# Patient Record
Sex: MALE | Race: BLACK OR AFRICAN AMERICAN | NOT HISPANIC OR LATINO | URBAN - METROPOLITAN AREA
[De-identification: names, ages, dates, MRNs, and addresses within clinical notes are randomized per-mention and may not be internally consistent; named-entity substitution may affect disease eponyms.]

---

## 2019-12-11 ENCOUNTER — OFFICE VISIT (OUTPATIENT)
Dept: URGENT CARE | Facility: CLINIC | Age: 49
End: 2019-12-11
Payer: COMMERCIAL

## 2019-12-11 ENCOUNTER — HOSPITAL ENCOUNTER (INPATIENT)
Facility: OTHER | Age: 49
LOS: 3 days | Discharge: HOME | DRG: 291 | End: 2019-12-14
Attending: EMERGENCY MEDICINE | Admitting: HOSPITALIST
Payer: COMMERCIAL

## 2019-12-11 VITALS
HEIGHT: 68 IN | HEART RATE: 167 BPM | TEMPERATURE: 99 F | DIASTOLIC BLOOD PRESSURE: 106 MMHG | OXYGEN SATURATION: 100 % | BODY MASS INDEX: 19.7 KG/M2 | WEIGHT: 130 LBS | SYSTOLIC BLOOD PRESSURE: 149 MMHG | RESPIRATION RATE: 20 BRPM

## 2019-12-11 DIAGNOSIS — I48.91 ATRIAL FIBRILLATION WITH RVR: Primary | ICD-10-CM

## 2019-12-11 DIAGNOSIS — J18.9 PNEUMONIA OF BOTH LUNGS DUE TO INFECTIOUS ORGANISM, UNSPECIFIED PART OF LUNG: ICD-10-CM

## 2019-12-11 DIAGNOSIS — R05.9 COUGH: ICD-10-CM

## 2019-12-11 DIAGNOSIS — I48.91 ATRIAL FIBRILLATION WITH RAPID VENTRICULAR RESPONSE: ICD-10-CM

## 2019-12-11 DIAGNOSIS — I48.91 ATRIAL FIBRILLATION WITH RVR: ICD-10-CM

## 2019-12-11 DIAGNOSIS — Z76.89 ENCOUNTER TO ESTABLISH CARE: ICD-10-CM

## 2019-12-11 DIAGNOSIS — I50.21 ACUTE SYSTOLIC CONGESTIVE HEART FAILURE: Primary | ICD-10-CM

## 2019-12-11 PROBLEM — R79.89 ELEVATED TROPONIN: Status: ACTIVE | Noted: 2019-12-11

## 2019-12-11 PROBLEM — I10 ESSENTIAL HYPERTENSION: Status: ACTIVE | Noted: 2019-12-11

## 2019-12-11 PROBLEM — R79.89 ELEVATED BRAIN NATRIURETIC PEPTIDE (BNP) LEVEL: Status: ACTIVE | Noted: 2019-12-11

## 2019-12-11 PROBLEM — I95.9 HYPOTENSION: Status: ACTIVE | Noted: 2019-12-11

## 2019-12-11 LAB
ALBUMIN SERPL BCP-MCNC: 3.5 G/DL (ref 3.5–5.2)
ALP SERPL-CCNC: 84 U/L (ref 55–135)
ALT SERPL W/O P-5'-P-CCNC: 44 U/L (ref 10–44)
AMPHET+METHAMPHET UR QL: NEGATIVE
ANION GAP SERPL CALC-SCNC: 10 MMOL/L (ref 8–16)
AST SERPL-CCNC: 35 U/L (ref 10–40)
BARBITURATES UR QL SCN>200 NG/ML: NEGATIVE
BASOPHILS # BLD AUTO: 0.05 K/UL (ref 0–0.2)
BASOPHILS NFR BLD: 0.4 % (ref 0–1.9)
BENZODIAZ UR QL SCN>200 NG/ML: NEGATIVE
BILIRUB SERPL-MCNC: 0.9 MG/DL (ref 0.1–1)
BILIRUB UR QL STRIP: NEGATIVE
BNP SERPL-MCNC: 875 PG/ML (ref 0–99)
BUN SERPL-MCNC: 14 MG/DL (ref 6–20)
BZE UR QL SCN: NEGATIVE
CALCIUM SERPL-MCNC: 9.4 MG/DL (ref 8.7–10.5)
CANNABINOIDS UR QL SCN: NEGATIVE
CHLORIDE SERPL-SCNC: 106 MMOL/L (ref 95–110)
CLARITY UR: CLEAR
CO2 SERPL-SCNC: 25 MMOL/L (ref 23–29)
COLOR UR: YELLOW
CREAT SERPL-MCNC: 1.3 MG/DL (ref 0.5–1.4)
CREAT UR-MCNC: 5.4 MG/DL (ref 23–375)
CTP QC/QA: YES
D DIMER PPP IA.FEU-MCNC: 1.08 MG/L FEU
DIFFERENTIAL METHOD: ABNORMAL
EOSINOPHIL # BLD AUTO: 0 K/UL (ref 0–0.5)
EOSINOPHIL NFR BLD: 0.2 % (ref 0–8)
ERYTHROCYTE [DISTWIDTH] IN BLOOD BY AUTOMATED COUNT: 15.8 % (ref 11.5–14.5)
EST. GFR  (AFRICAN AMERICAN): >60 ML/MIN/1.73 M^2
EST. GFR  (NON AFRICAN AMERICAN): >60 ML/MIN/1.73 M^2
ETHANOL UR-MCNC: <10 MG/DL
FLUAV AG NPH QL: NEGATIVE
FLUBV AG NPH QL: NEGATIVE
GLUCOSE SERPL-MCNC: 112 MG/DL (ref 70–110)
GLUCOSE UR QL STRIP: NEGATIVE
HCT VFR BLD AUTO: 49.1 % (ref 40–54)
HGB BLD-MCNC: 15.2 G/DL (ref 14–18)
HGB UR QL STRIP: NEGATIVE
IMM GRANULOCYTES # BLD AUTO: 0.06 K/UL (ref 0–0.04)
IMM GRANULOCYTES NFR BLD AUTO: 0.4 % (ref 0–0.5)
INR PPP: 1 (ref 0.8–1.2)
KETONES UR QL STRIP: NEGATIVE
LACTATE SERPL-SCNC: 1.5 MMOL/L (ref 0.5–2.2)
LEUKOCYTE ESTERASE UR QL STRIP: NEGATIVE
LYMPHOCYTES # BLD AUTO: 1.1 K/UL (ref 1–4.8)
LYMPHOCYTES NFR BLD: 8.2 % (ref 18–48)
MAGNESIUM SERPL-MCNC: 1.8 MG/DL (ref 1.6–2.6)
MCH RBC QN AUTO: 27.5 PG (ref 27–31)
MCHC RBC AUTO-ENTMCNC: 31 G/DL (ref 32–36)
MCV RBC AUTO: 89 FL (ref 82–98)
METHADONE UR QL SCN>300 NG/ML: NEGATIVE
MONOCYTES # BLD AUTO: 0.7 K/UL (ref 0.3–1)
MONOCYTES NFR BLD: 4.9 % (ref 4–15)
NEUTROPHILS # BLD AUTO: 11.6 K/UL (ref 1.8–7.7)
NEUTROPHILS NFR BLD: 85.9 % (ref 38–73)
NITRITE UR QL STRIP: NEGATIVE
NRBC BLD-RTO: 0 /100 WBC
OPIATES UR QL SCN: NEGATIVE
PCP UR QL SCN>25 NG/ML: NEGATIVE
PH UR STRIP: 7 [PH] (ref 5–8)
PLATELET # BLD AUTO: 230 K/UL (ref 150–350)
PMV BLD AUTO: 12.7 FL (ref 9.2–12.9)
POTASSIUM SERPL-SCNC: 4 MMOL/L (ref 3.5–5.1)
PROCALCITONIN SERPL IA-MCNC: 0.19 NG/ML
PROT SERPL-MCNC: 7.2 G/DL (ref 6–8.4)
PROT UR QL STRIP: NEGATIVE
PROTHROMBIN TIME: 11.4 SEC (ref 9–12.5)
RBC # BLD AUTO: 5.52 M/UL (ref 4.6–6.2)
SODIUM SERPL-SCNC: 141 MMOL/L (ref 136–145)
SP GR UR STRIP: 1.01 (ref 1–1.03)
TOXICOLOGY INFORMATION: ABNORMAL
TROPONIN I SERPL DL<=0.01 NG/ML-MCNC: 0.04 NG/ML (ref 0–0.03)
TROPONIN I SERPL DL<=0.01 NG/ML-MCNC: 0.04 NG/ML (ref 0–0.03)
TROPONIN I SERPL DL<=0.01 NG/ML-MCNC: 0.05 NG/ML (ref 0–0.03)
TROPONIN I SERPL DL<=0.01 NG/ML-MCNC: 0.05 NG/ML (ref 0–0.03)
TROPONIN I SERPL DL<=0.01 NG/ML-MCNC: 0.06 NG/ML (ref 0–0.03)
TSH SERPL DL<=0.005 MIU/L-ACNC: 1.72 UIU/ML (ref 0.4–4)
URN SPEC COLLECT METH UR: NORMAL
UROBILINOGEN UR STRIP-ACNC: NEGATIVE EU/DL
WBC # BLD AUTO: 13.53 K/UL (ref 3.9–12.7)

## 2019-12-11 PROCEDURE — 83735 ASSAY OF MAGNESIUM: CPT

## 2019-12-11 PROCEDURE — 96365 THER/PROPH/DIAG IV INF INIT: CPT

## 2019-12-11 PROCEDURE — 99291 CRITICAL CARE FIRST HOUR: CPT | Mod: 25

## 2019-12-11 PROCEDURE — 93010 ELECTROCARDIOGRAM REPORT: CPT | Mod: ,,, | Performed by: INTERNAL MEDICINE

## 2019-12-11 PROCEDURE — 99204 PR OFFICE/OUTPT VISIT, NEW, LEVL IV, 45-59 MIN: ICD-10-PCS | Mod: S$GLB,,, | Performed by: PHYSICIAN ASSISTANT

## 2019-12-11 PROCEDURE — 99223 1ST HOSP IP/OBS HIGH 75: CPT | Mod: ,,, | Performed by: HOSPITALIST

## 2019-12-11 PROCEDURE — 83605 ASSAY OF LACTIC ACID: CPT

## 2019-12-11 PROCEDURE — 25000003 PHARM REV CODE 250: Performed by: EMERGENCY MEDICINE

## 2019-12-11 PROCEDURE — 85610 PROTHROMBIN TIME: CPT

## 2019-12-11 PROCEDURE — 63600175 PHARM REV CODE 636 W HCPCS: Performed by: HOSPITALIST

## 2019-12-11 PROCEDURE — 93005 ELECTROCARDIOGRAM TRACING: CPT

## 2019-12-11 PROCEDURE — 81003 URINALYSIS AUTO W/O SCOPE: CPT

## 2019-12-11 PROCEDURE — 96367 TX/PROPH/DG ADDL SEQ IV INF: CPT

## 2019-12-11 PROCEDURE — 85379 FIBRIN DEGRADATION QUANT: CPT

## 2019-12-11 PROCEDURE — 25000003 PHARM REV CODE 250: Performed by: INTERNAL MEDICINE

## 2019-12-11 PROCEDURE — 63600175 PHARM REV CODE 636 W HCPCS: Performed by: EMERGENCY MEDICINE

## 2019-12-11 PROCEDURE — 87804 POCT INFLUENZA A/B: ICD-10-PCS | Mod: QW,S$GLB,, | Performed by: PHYSICIAN ASSISTANT

## 2019-12-11 PROCEDURE — 84145 PROCALCITONIN (PCT): CPT

## 2019-12-11 PROCEDURE — 99223 PR INITIAL HOSPITAL CARE,LEVL III: ICD-10-PCS | Mod: ,,, | Performed by: HOSPITALIST

## 2019-12-11 PROCEDURE — 93005 EKG 12-LEAD: ICD-10-PCS | Mod: S$GLB,,, | Performed by: PHYSICIAN ASSISTANT

## 2019-12-11 PROCEDURE — 20000000 HC ICU ROOM

## 2019-12-11 PROCEDURE — 83880 ASSAY OF NATRIURETIC PEPTIDE: CPT

## 2019-12-11 PROCEDURE — 80053 COMPREHEN METABOLIC PANEL: CPT

## 2019-12-11 PROCEDURE — 87040 BLOOD CULTURE FOR BACTERIA: CPT

## 2019-12-11 PROCEDURE — 99204 OFFICE O/P NEW MOD 45 MIN: CPT | Mod: S$GLB,,, | Performed by: PHYSICIAN ASSISTANT

## 2019-12-11 PROCEDURE — 80307 DRUG TEST PRSMV CHEM ANLYZR: CPT

## 2019-12-11 PROCEDURE — 93010 EKG 12-LEAD: ICD-10-PCS | Mod: ,,, | Performed by: INTERNAL MEDICINE

## 2019-12-11 PROCEDURE — 84484 ASSAY OF TROPONIN QUANT: CPT

## 2019-12-11 PROCEDURE — 85025 COMPLETE CBC W/AUTO DIFF WBC: CPT

## 2019-12-11 PROCEDURE — 96361 HYDRATE IV INFUSION ADD-ON: CPT

## 2019-12-11 PROCEDURE — 93010 ELECTROCARDIOGRAM REPORT: CPT | Mod: S$GLB,,, | Performed by: INTERNAL MEDICINE

## 2019-12-11 PROCEDURE — 87804 INFLUENZA ASSAY W/OPTIC: CPT | Mod: QW,S$GLB,, | Performed by: PHYSICIAN ASSISTANT

## 2019-12-11 PROCEDURE — 84484 ASSAY OF TROPONIN QUANT: CPT | Mod: 91

## 2019-12-11 PROCEDURE — 93005 ELECTROCARDIOGRAM TRACING: CPT | Mod: S$GLB,,, | Performed by: PHYSICIAN ASSISTANT

## 2019-12-11 PROCEDURE — 96375 TX/PRO/DX INJ NEW DRUG ADDON: CPT

## 2019-12-11 PROCEDURE — 93010 EKG 12-LEAD: ICD-10-PCS | Mod: S$GLB,,, | Performed by: INTERNAL MEDICINE

## 2019-12-11 PROCEDURE — 25000003 PHARM REV CODE 250: Performed by: HOSPITALIST

## 2019-12-11 PROCEDURE — 84443 ASSAY THYROID STIM HORMONE: CPT

## 2019-12-11 PROCEDURE — 94761 N-INVAS EAR/PLS OXIMETRY MLT: CPT

## 2019-12-11 PROCEDURE — 63600175 PHARM REV CODE 636 W HCPCS: Performed by: INTERNAL MEDICINE

## 2019-12-11 PROCEDURE — 36415 COLL VENOUS BLD VENIPUNCTURE: CPT

## 2019-12-11 RX ORDER — SODIUM CHLORIDE 0.9 % (FLUSH) 0.9 %
10 SYRINGE (ML) INJECTION
Status: DISCONTINUED | OUTPATIENT
Start: 2019-12-11 | End: 2019-12-14 | Stop reason: HOSPADM

## 2019-12-11 RX ORDER — FUROSEMIDE 10 MG/ML
40 INJECTION INTRAMUSCULAR; INTRAVENOUS 2 TIMES DAILY
Status: DISCONTINUED | OUTPATIENT
Start: 2019-12-11 | End: 2019-12-11

## 2019-12-11 RX ORDER — POTASSIUM CHLORIDE 20 MEQ/1
20 TABLET, EXTENDED RELEASE ORAL 2 TIMES DAILY
Status: COMPLETED | OUTPATIENT
Start: 2019-12-11 | End: 2019-12-12

## 2019-12-11 RX ORDER — ONDANSETRON 8 MG/1
8 TABLET, ORALLY DISINTEGRATING ORAL EVERY 8 HOURS PRN
Status: DISCONTINUED | OUTPATIENT
Start: 2019-12-11 | End: 2019-12-14 | Stop reason: HOSPADM

## 2019-12-11 RX ORDER — ENOXAPARIN SODIUM 100 MG/ML
40 INJECTION SUBCUTANEOUS EVERY 24 HOURS
Status: DISCONTINUED | OUTPATIENT
Start: 2019-12-11 | End: 2019-12-11

## 2019-12-11 RX ORDER — METOPROLOL TARTRATE 25 MG/1
25 TABLET, FILM COATED ORAL 2 TIMES DAILY
Status: DISCONTINUED | OUTPATIENT
Start: 2019-12-12 | End: 2019-12-12

## 2019-12-11 RX ORDER — DILTIAZEM HCL 1 MG/ML
5 INJECTION, SOLUTION INTRAVENOUS CONTINUOUS
Status: DISCONTINUED | OUTPATIENT
Start: 2019-12-11 | End: 2019-12-12

## 2019-12-11 RX ORDER — ACETAMINOPHEN 325 MG/1
650 TABLET ORAL EVERY 4 HOURS PRN
Status: DISCONTINUED | OUTPATIENT
Start: 2019-12-11 | End: 2019-12-11

## 2019-12-11 RX ORDER — FUROSEMIDE 10 MG/ML
40 INJECTION INTRAMUSCULAR; INTRAVENOUS ONCE
Status: COMPLETED | OUTPATIENT
Start: 2019-12-11 | End: 2019-12-11

## 2019-12-11 RX ORDER — GUAIFENESIN 600 MG/1
600 TABLET, EXTENDED RELEASE ORAL 2 TIMES DAILY
Status: DISCONTINUED | OUTPATIENT
Start: 2019-12-11 | End: 2019-12-14 | Stop reason: HOSPADM

## 2019-12-11 RX ORDER — METOPROLOL TARTRATE 1 MG/ML
5 INJECTION, SOLUTION INTRAVENOUS
Status: COMPLETED | OUTPATIENT
Start: 2019-12-11 | End: 2019-12-11

## 2019-12-11 RX ORDER — ENOXAPARIN SODIUM 100 MG/ML
1 INJECTION SUBCUTANEOUS EVERY 12 HOURS
Status: DISCONTINUED | OUTPATIENT
Start: 2019-12-11 | End: 2019-12-12

## 2019-12-11 RX ORDER — SODIUM CHLORIDE 0.9 % (FLUSH) 0.9 %
10 SYRINGE (ML) INJECTION
Status: DISCONTINUED | OUTPATIENT
Start: 2019-12-11 | End: 2019-12-11

## 2019-12-11 RX ORDER — LEVOFLOXACIN 5 MG/ML
750 INJECTION, SOLUTION INTRAVENOUS
Status: DISCONTINUED | OUTPATIENT
Start: 2019-12-12 | End: 2019-12-13

## 2019-12-11 RX ORDER — BENZONATATE 100 MG/1
100 CAPSULE ORAL 3 TIMES DAILY PRN
Status: DISCONTINUED | OUTPATIENT
Start: 2019-12-11 | End: 2019-12-14 | Stop reason: HOSPADM

## 2019-12-11 RX ORDER — DILTIAZEM HYDROCHLORIDE 5 MG/ML
10 INJECTION INTRAVENOUS
Status: COMPLETED | OUTPATIENT
Start: 2019-12-11 | End: 2019-12-11

## 2019-12-11 RX ORDER — ACETAMINOPHEN 325 MG/1
650 TABLET ORAL EVERY 8 HOURS PRN
Status: DISCONTINUED | OUTPATIENT
Start: 2019-12-11 | End: 2019-12-14 | Stop reason: HOSPADM

## 2019-12-11 RX ADMIN — DILTIAZEM HYDROCHLORIDE 5 MG/HR: 5 INJECTION INTRAVENOUS at 03:12

## 2019-12-11 RX ADMIN — ENOXAPARIN SODIUM 70 MG: 100 INJECTION SUBCUTANEOUS at 07:12

## 2019-12-11 RX ADMIN — POTASSIUM CHLORIDE 20 MEQ: 1500 TABLET, EXTENDED RELEASE ORAL at 07:12

## 2019-12-11 RX ADMIN — SODIUM CHLORIDE 500 ML: 0.9 INJECTION, SOLUTION INTRAVENOUS at 01:12

## 2019-12-11 RX ADMIN — AZITHROMYCIN MONOHYDRATE 500 MG: 500 INJECTION, POWDER, LYOPHILIZED, FOR SOLUTION INTRAVENOUS at 12:12

## 2019-12-11 RX ADMIN — FUROSEMIDE 40 MG: 10 INJECTION, SOLUTION INTRAMUSCULAR; INTRAVENOUS at 07:12

## 2019-12-11 RX ADMIN — METOPROLOL TARTRATE 5 MG: 1 INJECTION, SOLUTION INTRAVENOUS at 10:12

## 2019-12-11 RX ADMIN — GUAIFENESIN 600 MG: 600 TABLET, EXTENDED RELEASE ORAL at 08:12

## 2019-12-11 RX ADMIN — Medication 500 ML: at 10:12

## 2019-12-11 RX ADMIN — CEFTRIAXONE 1 G: 1 INJECTION, SOLUTION INTRAVENOUS at 11:12

## 2019-12-11 RX ADMIN — DILTIAZEM HYDROCHLORIDE 10 MG: 5 INJECTION INTRAVENOUS at 12:12

## 2019-12-11 NOTE — PATIENT INSTRUCTIONS
- Based on your exam today I fell you need further evaluation immediately.  You should go to the ER of your choice for further evaluation and treatment.

## 2019-12-11 NOTE — ASSESSMENT & PLAN NOTE
-Treatment as above.  -Likely secondary to demand ischemia from afib/rvr  -No chest pain and no obvious ischemic changes on EKG at this time.

## 2019-12-11 NOTE — ED NOTES
Pt  to er with c/o cough and cold the patient was sent over from urgent care because he has afib with rvr. Pt denies chest pain , sob or palpitation . Pt aaox3 skin warm and dry heart rate irregular rate tachy. Lungs clear air movement good ./ abdomin soft  Non-tender with normal active bowels . No peripheral edema noted.

## 2019-12-11 NOTE — ED NOTES
Pt AAOx4, resp pattern even and non labored. Per Marker MD, notify eco team to perform eco at bedside. Per Marker MD, start diltiazem before pt goes to ICU. Pt  SBP 96/77, diltiazem drip held. Report to be called to ICU.

## 2019-12-11 NOTE — ASSESSMENT & PLAN NOTE
-Mr. Thomas is admitted to inpatient status in our ICU  -No prior cardiac history noted.  TSH is normal.  Troponin minimally elevated.  No chest pain  -Suspect secondary to coughing and respiratory illness  -Did not respond to well to lopressor in ER.  HR transiently improved after receiving diltiazem 10 but his blood pressure did drop  -Will order echo and trend troponins.  -Will attempt cardizem drip if BP will allow  -[id number] score of 1 for hypertension.  Echo to eval for CHF is pending.  Hopefully will not need anticoagulation.  -Will consult cardiology for evaluation.

## 2019-12-11 NOTE — ASSESSMENT & PLAN NOTE
-Suspect due to lopressor and diltiazem in ER and not sepsis, but cannot be certain so will monitor in ICU.  -Check cortisol in AM  -Trend troponins  -Check echo  -Avoiding fluids at this moment due to elevated BNP.

## 2019-12-11 NOTE — ASSESSMENT & PLAN NOTE
-Noted to be 875 on admit  -Likely due to transient diminished cardiac output from afib/rvr  -Checking echo  -Trend troponins  -Strict ins/outs and daily weights.

## 2019-12-11 NOTE — ED NOTES
Rounding on the patient has been done. he has been updated on the plan of care and his current status. Pain was assessed and is currently a 0/10. Comfort positioning and restroom needs were addressed. Necessary items were placed with in his reach and he was advised when a reassessment would take place. The call bell remains at the bedside for any additional patient needs. The patient is resting comfortably on the stretcher, respirations are even and unlabored, skin warm and dry. Will continue to monitor. Heart rate 112 to 140 on monitor . md informed

## 2019-12-11 NOTE — ED NOTES
Pt scheduled to leave for ECO, but pt has an unstable BP. Marker, MD notified. Waiting for further instructions from MD.

## 2019-12-11 NOTE — ASSESSMENT & PLAN NOTE
-Diagnosed several years ago, but has not been on treatment for at least three years.  -EKG shows likely LVH  -Hypertensive on admit but became hypotensive after receiving lopressor and diltiazem  -Monitor closely in ICU.

## 2019-12-11 NOTE — HPI
"Mr. Thomas is a 49 year old man with history of hypertension and medication non-compliance who came in for evaluation of racing heart and codl symptoms for three days.  He states he has had a dry cough for the last three days and took cough syrup with minimal relief.  He denies fever, chills, headache, myalgias, body aches, chest pain and fever.  His mother compliments the history and ntoes that last night he seemed to be coughing more and was working a bit harder to breath.  They went to urgent care this morning and he was noted to have a pulse of 167.  EKG was obtained which showed atrial fibrillation with rvr and he was referred to our ER.  He notes that he could "adams feel" that his heart rate was going fast.  His mother interjects that he was very weak and light headed this morning.  In the ER he was initially hypertensive and found to have afib with rvr.  He was given lopressor with minimal results and then given diltiazem and his hr quickly improved to the 90s.  Subsequently his blood pressure dropped into the upper 80s, but he continued without symptoms of light headedness or shortness of breath even when raising himself up for exam.  He is admitted to the ICU with low suspicious of sepsis for close monitoring, cardizem drip and IV antibiotics for pneumonia.  "

## 2019-12-11 NOTE — ED NOTES
Bed: 02  Expected date:   Expected time:   Means of arrival:   Comments:  AFIB with RVR from urgent care

## 2019-12-11 NOTE — H&P
"[facility] Hospital Medicine  History & Physical    Patient Name: Ilya Thomas  MRN: [id number]  Admission Date: 12/11/2019  Attending Physician: Nicholas Vargas MD  Primary Care Provider: Primary Doctor No         Patient information was obtained from patient, relative(s) and ER records.     Subjective:     Principal Problem:Atrial fibrillation with RVR    Chief Complaint:   Chief Complaint   Patient presents with    Atrial Fibrillation     sent to ED from , AFIB w/ RVR. Pt c/o cough and SOB        HPI: Mr. Thomas is a 49 year old man with history of hypertension and medication non-compliance who came in for evaluation of racing heart and codl symptoms for three days.  He states he has had a dry cough for the last three days and took cough syrup with minimal relief.  He denies fever, chills, headache, myalgias, body aches, chest pain and fever.  His mother compliments the history and ntoes that last night he seemed to be coughing more and was working a bit harder to breath.  They went to urgent care this morning and he was noted to have a pulse of 167.  EKG was obtained which showed atrial fibrillation with rvr and he was referred to our ER.  He notes that he could "adams feel" that his heart rate was going fast.  His mother interjects that he was very weak and light headed this morning.  In the ER he was initially hypertensive and found to have afib with rvr.  He was given lopressor with minimal results and then given diltiazem and his hr quickly improved to the 90s.  Subsequently his blood pressure dropped into the upper 80s, but he continued without symptoms of light headedness or shortness of breath even when raising himself up for exam.  He is admitted to the ICU with low suspicious of sepsis for close monitoring, cardizem drip and IV antibiotics for pneumonia.    Past Medical History:   Diagnosis Date    Hypertension        Past Surgical History:   Procedure Laterality Date    " HERNIA REPAIR         Review of patient's allergies indicates:  No Known Allergies    No current facility-administered medications on file prior to encounter.      No current outpatient medications on file prior to encounter.     Family History     Problem Relation (Age of Onset)    No Known Problems Mother        Tobacco Use    Smoking status: Never Smoker   Substance and Sexual Activity    Alcohol use: Never     Frequency: Never    Drug use: Never    Sexual activity: Not Currently     Review of Systems   Constitutional: Negative for activity change, appetite change and fever.   HENT: Negative for congestion and dental problem.    Eyes: Negative for discharge and itching.   Respiratory: Positive for cough. Negative for apnea, chest tightness and shortness of breath.    Cardiovascular: Negative for chest pain and leg swelling.   Gastrointestinal: Negative for abdominal distention and abdominal pain.   Endocrine: Negative for cold intolerance and heat intolerance.   Genitourinary: Negative for difficulty urinating and dysuria.   Musculoskeletal: Negative for arthralgias and back pain.   Allergic/Immunologic: Negative for environmental allergies and food allergies.   Neurological: Positive for weakness and light-headedness. Negative for dizziness and facial asymmetry.   Hematological: Negative for adenopathy. Does not bruise/bleed easily.   Psychiatric/Behavioral: Negative for agitation and behavioral problems.     Objective:     Vital Signs (Most Recent):  Temp: 99.3 °F (37.4 °C) (12/11/19 1515)  Pulse: (!) 132 (12/11/19 1630)  Resp: (!) 25 (12/11/19 1630)  BP: (!) 121/104 (12/11/19 1630)  SpO2: 97 % (12/11/19 1630) Vital Signs (24h Range):  Temp:  [98.2 °F (36.8 °C)-99.3 °F (37.4 °C)] 99.3 °F (37.4 °C)  Pulse:  [] 132  Resp:  [16-32] 25  SpO2:  [83 %-100 %] 97 %  BP: ()/() 121/104     Weight: 65.5 kg (144 lb 6.4 oz)  Body mass index is 21.96 kg/m².    Physical Exam   Constitutional: He is  oriented to person, place, and time. He appears well-developed.   Thin man with a mildly toxic appearance   HENT:   Head: Normocephalic and atraumatic.   Eyes: Pupils are equal, round, and reactive to light. EOM are normal.   Neck: Normal range of motion. Neck supple.   Cardiovascular:   Irregularly irregular, tachycardic, no murmurs   Pulmonary/Chest: Effort normal and breath sounds normal. No respiratory distress.   Abdominal: Soft. Bowel sounds are normal. He exhibits no distension. There is no tenderness.   Musculoskeletal: Normal range of motion. He exhibits no edema.   Neurological: He is oriented to person, place, and time. No cranial nerve deficit. Coordination normal.   Skin: Skin is warm and dry.   Psychiatric: He has a normal mood and affect. His behavior is normal.   Vitals reviewed.        CRANIAL NERVES     CN III, IV, VI   Pupils are equal, round, and reactive to light.  Extraocular motions are normal.        Significant Labs: All pertinent labs within the past 24 hours have been reviewed.    Significant Imaging: I have reviewed and interpreted all pertinent imaging results/findings within the past 24 hours.    Assessment/Plan:     * Atrial fibrillation with RVR  -Mr. Thomas is admitted to inpatient status in our ICU  -No prior cardiac history noted.  TSH is normal.  Troponin minimally elevated.  No chest pain  -Suspect secondary to coughing and respiratory illness  -Did not respond to well to lopressor in ER.  HR transiently improved after receiving diltiazem 10 but his blood pressure did drop  -Will order echo and trend troponins.  -Will attempt cardizem drip if BP will allow  -[id number] score of 1 for hypertension.  Echo to eval for CHF is pending.  Hopefully will not need anticoagulation.  -Will consult cardiology for evaluation.      Community acquired pneumonia of right lung  -On admit he was euthermic with normal lactic acid but leukocytosis and evidence of bilateral pneumonia vs pulmonary  edema   -Blood cultures obtained in ER and he received rocephin and azithromycin in ER  -Will check sputum culture and procalcitonin  -Will treat with levaquin, tessalon perles and guaifenesin for now.  -Lungs are clear so will avoid beta-agonist nebs given afib.  -Do not think he is septic, rather hypotension likely due to lopressor and diltiazem     Hypotension  -Suspect due to lopressor and diltiazem in ER and not sepsis, but cannot be certain so will monitor in ICU.  -Check cortisol in AM  -Trend troponins  -Check echo  -Avoiding fluids at this moment due to elevated BNP.      Elevated brain natriuretic peptide (BNP) level  -Noted to be 875 on admit  -Likely due to transient diminished cardiac output from afib/rvr  -Checking echo  -Trend troponins  -Strict ins/outs and daily weights.      Elevated troponin  -Treatment as above.  -Likely secondary to demand ischemia from afib/rvr  -No chest pain and no obvious ischemic changes on EKG at this time.      Essential hypertension  -Diagnosed several years ago, but has not been on treatment for at least three years.  -EKG shows likely LVH  -Hypertensive on admit but became hypotensive after receiving lopressor and diltiazem  -Monitor closely in ICU.        VTE Risk Mitigation (From admission, onward)         Ordered     enoxaparin injection 40 mg  Daily      12/11/19 1510     IP VTE LOW RISK PATIENT  Once      12/11/19 1511     Place CINTIA hose  Until discontinued      12/11/19 1511               35 min cc time    Nicholas Vargas MD  Department of Hospital Medicine   [facility]

## 2019-12-11 NOTE — PROGRESS NOTES
"Subjective:       Patient ID: Ilya Thomas is a 49 y.o. male.    Vitals:  height is 5' 8" (1.727 m) and weight is 59 kg (130 lb). His temperature is 98.8 °F (37.1 °C). His blood pressure is 149/106 (abnormal) and his pulse is 167 (abnormal). His respiration is 20 and oxygen saturation is 100%.     Chief Complaint: Fatigue    Patient is a 49-year-old male with no known past medical history complaining of cough that began 2-3 days ago.  He took some cough syrup last night which helped a little bit.  Cough is dry.  No congestion, runny nose, sore throat, fever, chest pain, or shortness of breath.  Patient does not have PCP.  Denies chest pain or palpitations.  Denies dyspnea on exertion.  Denies lower extremity edema. Mother states that this morning he told her that he felt weak, but patient denies feeling weak or fatigued at the moment.  No body aches, chills, or headaches.  No known history of AFib or heart abnormality/arrhythmia. Does not drink alcohol.     Fatigue   This is a new problem. The current episode started in the past 7 days. The problem occurs constantly. The problem has been unchanged. Associated symptoms include coughing and fatigue. Pertinent negatives include no arthralgias, chest pain, chills, congestion, diaphoresis, fever, headaches, joint swelling, myalgias, nausea, rash, sore throat, vertigo or vomiting. Nothing aggravates the symptoms. He has tried nothing for the symptoms. The treatment provided no relief.       Constitution: Positive for fatigue. Negative for appetite change, chills, sweating and fever.   HENT: Negative for congestion and sore throat.    Neck: Negative for painful lymph nodes.   Cardiovascular: Negative for chest pain and leg swelling.   Eyes: Negative for double vision and blurred vision.   Respiratory: Positive for cough. Negative for sleep apnea, chest tightness, sputum production, bloody sputum, COPD, shortness of breath, stridor, wheezing and asthma.  "   Gastrointestinal: Negative for nausea, vomiting and diarrhea.   Genitourinary: Negative for dysuria, frequency and urgency.   Musculoskeletal: Negative for joint pain, joint swelling, muscle cramps and muscle ache.   Skin: Negative for color change, pale and rash.   Allergic/Immunologic: Negative for seasonal allergies and asthma.   Neurological: Negative for dizziness, history of vertigo, light-headedness, passing out and headaches.   Hematologic/Lymphatic: Negative for swollen lymph nodes, easy bruising/bleeding and history of blood clots. Does not bruise/bleed easily.   Psychiatric/Behavioral: Negative for nervous/anxious, sleep disturbance and depression. The patient is not nervous/anxious.        Objective:      Physical Exam   Constitutional: He is oriented to person, place, and time. He appears well-developed and well-nourished. He is cooperative.  Non-toxic appearance. He does not have a sickly appearance. He does not appear ill. No distress.   Patient sitting comfortably in no acute distress.  Nontoxic appearing.   HENT:   Head: Normocephalic and atraumatic.   Right Ear: Hearing, tympanic membrane, external ear and ear canal normal.   Left Ear: Hearing, tympanic membrane, external ear and ear canal normal.   Nose: Nose normal. No mucosal edema, rhinorrhea or nasal deformity. No epistaxis. Right sinus exhibits no maxillary sinus tenderness and no frontal sinus tenderness. Left sinus exhibits no maxillary sinus tenderness and no frontal sinus tenderness.   Mouth/Throat: Uvula is midline, oropharynx is clear and moist and mucous membranes are normal. No trismus in the jaw. Normal dentition. No uvula swelling. No oropharyngeal exudate, posterior oropharyngeal edema or posterior oropharyngeal erythema.   Eyes: Conjunctivae and lids are normal. Right eye exhibits no discharge. Left eye exhibits no discharge. No scleral icterus.   Neck: Trachea normal, normal range of motion, full passive range of motion without  pain and phonation normal. Neck supple. No neck rigidity. No edema and no erythema present.   Cardiovascular: Normal heart sounds, intact distal pulses and normal pulses. An irregularly irregular rhythm present. Tachycardia present.   Pulmonary/Chest: Effort normal and breath sounds normal. No accessory muscle usage or stridor. No tachypnea and no bradypnea. No respiratory distress. He has no decreased breath sounds. He has no wheezes. He has no rhonchi. He has no rales.   No respiratory distress.  Patient does not cough throughout exam.   Abdominal: Soft. Normal appearance and bowel sounds are normal. He exhibits no distension, no pulsatile midline mass and no mass. There is no tenderness.   Musculoskeletal: Normal range of motion. He exhibits no edema or deformity.   No lower extremity edema or leg pain.   Neurological: He is alert and oriented to person, place, and time. He exhibits normal muscle tone. Coordination normal.   Skin: Skin is warm, dry, intact, not diaphoretic and not pale.   Psychiatric: He has a normal mood and affect. His speech is normal and behavior is normal. Judgment and thought content normal. Cognition and memory are normal.   Nursing note and vitals reviewed.          Results for orders placed or performed in visit on 12/11/19   POCT Influenza A/B   Result Value Ref Range    Rapid Influenza A Ag Negative Negative    Rapid Influenza B Ag Negative Negative     Acceptable Yes      The EKG shows a abnormal, irregularly irregular Rhythm, at a rate of 167, there are not ST Changes. There is not a previous EKG for comparison. This EKG was interpreted by me (and discussed with Dr. Arellano).    Onset of AFib unknown, the patient declines known history of it..    Because of new diagnosis of AFib with RVR, HR too high to treat outpatiently, instructed to go to ER. I called [facility] to report patient. Patient's mother is driving him to ER. He is stable at discharge. Case  discussed with Dr. Arellano.    Assessment:       1. Atrial fibrillation with RVR    2. Cough    3. Encounter to establish care        Plan:         Atrial fibrillation with RVR  -     Ambulatory referral to Cardiology    Cough  -     POCT Influenza A/B  -     IN OFFICE EKG 12-LEAD (to Muse)    Encounter to establish care  -     Ambulatory referral to Internal Medicine      Patient Instructions   - Based on your exam today I fell you need further evaluation immediately.  You should go to the ER of your choice for further evaluation and treatment.

## 2019-12-11 NOTE — ED NOTES
Patient placed on continuous cardiac monitor, automatic blood pressure and pulse ox. Rounding on the patient has been done. he has been updated on the plan of care and his current status. Pain was assessed and is currently a 0/10. Comfort positioning and restroom needs were addressed. Necessary items were placed with in his reach and he was advised when a reassessment would take place. The call bell remains at the bedside for any additional patient needs. The patient is resting comfortably on the stretcher, respirations are even and unlabored, skin warm and dry. Will continue to monitor. Pt reminded need  For urine

## 2019-12-11 NOTE — ED NOTES
md informed about trending down bp. Pt aaox3 skin warm and dry. 500 ns hung . Pt place  trendelenburg. . Family at bedside.

## 2019-12-11 NOTE — ED PROVIDER NOTES
Encounter Date: 12/11/2019    SCRIBE #1 NOTE: INatividad, am scribing for, and in the presence of, Dr. Mesa.       History     Chief Complaint   Patient presents with    Atrial Fibrillation     sent to ED from [facility], AFIB w/ RVR. Pt c/o cough and SOB     Time seen by provider: 9:45 AM    This is a 49 y.o. male with hx of HTN who was sent to the ED from Urgent Care with EKG that showed A-fib with RVR. He was initially seen there for cough. He reports racing heart and palpitations today. He denies fever, congestion, rhinorrhea, shortness of breath, or chest pain. He does not take antihypertensives.    The history is provided by the patient and medical records.     Review of patient's allergies indicates:  No Known Allergies  Past Medical History:   Diagnosis Date    Hypertension      Past Surgical History:   Procedure Laterality Date    HERNIA REPAIR       Family History   Problem Relation Age of Onset    No Known Problems Mother      Social History     Tobacco Use    Smoking status: Never Smoker   Substance Use Topics    Alcohol use: Never     Frequency: Never    Drug use: Never     Review of Systems   Constitutional: Negative for fever.   HENT: Negative for sore throat.    Respiratory: Positive for cough. Negative for shortness of breath.    Cardiovascular: Positive for palpitations. Negative for chest pain.   Gastrointestinal: Negative for nausea.   Genitourinary: Negative for dysuria.   Musculoskeletal: Negative for back pain.   Skin: Negative for color change, rash and wound.   Neurological: Negative for weakness.   Hematological: Does not bruise/bleed easily.   All other systems reviewed and are negative.      Physical Exam     Initial Vitals   BP Pulse Resp Temp SpO2   12/11/19 0954 12/11/19 0954 12/11/19 0954 12/11/19 1001 12/11/19 0954   (!) 129/95 (!) 167 17 98.2 °F (36.8 °C) 95 %      MAP       --                Physical Exam    Nursing note and vitals reviewed.  Constitutional: He appears  well-developed and well-nourished. He is not diaphoretic. No distress.   Thin.   HENT:   Head: Normocephalic and atraumatic.   Eyes: EOM are normal. Pupils are equal, round, and reactive to light.   Neck: Normal range of motion. Neck supple.   Cardiovascular: Normal heart sounds. An irregular rhythm present.  Tachycardia present.  Exam reveals no gallop and no friction rub.    No murmur heard.  Pulmonary/Chest: Breath sounds normal. No respiratory distress. He has no wheezes. He has no rhonchi. He has no rales.   Musculoskeletal: Normal range of motion. He exhibits no edema or tenderness.   Neurological: He is alert and oriented to person, place, and time.   Skin: Skin is warm and dry.   Psychiatric: He has a normal mood and affect. His behavior is normal. Judgment and thought content normal.         ED Course   Critical Care  Date/Time: 12/11/2019 11:50 AM  Performed by: Keke Mesa MD  Authorized by: Keke Mesa MD   Direct patient critical care time: 20 minutes  Additional history critical care time: 6 minutes  Ordering / reviewing critical care time: 6 minutes  Documentation critical care time: 7 minutes  Consult with family critical care time: 6 minutes  Total critical care time (exclusive of procedural time) : 45 minutes  Critical care time was exclusive of separately billable procedures and treating other patients and teaching time.  Critical care was necessary to treat or prevent imminent or life-threatening deterioration of the following conditions: cardiac failure.  Critical care was time spent personally by me on the following activities: examination of patient, review of old charts, obtaining history from patient or surrogate, development of treatment plan with patient or surrogate, ordering and performing treatments and interventions, ordering and review of laboratory studies, ordering and review of radiographic studies, evaluation of patient's response to treatment and re-evaluation of patient's  condition.        Labs Reviewed   CBC W/ AUTO DIFFERENTIAL - Abnormal; Notable for the following components:       Result Value    WBC 13.53 (*)     Mean Corpuscular Hemoglobin Conc 31.0 (*)     RDW 15.8 (*)     Gran # (ANC) 11.6 (*)     Immature Grans (Abs) 0.06 (*)     Gran% 85.9 (*)     Lymph% 8.2 (*)     All other components within normal limits   COMPREHENSIVE METABOLIC PANEL - Abnormal; Notable for the following components:    Glucose 112 (*)     All other components within normal limits   TROPONIN I - Abnormal; Notable for the following components:    Troponin I 0.060 (*)     All other components within normal limits   B-TYPE NATRIURETIC PEPTIDE - Abnormal; Notable for the following components:     (*)     All other components within normal limits   TROPONIN I - Abnormal; Notable for the following components:    Troponin I 0.037 (*)     All other components within normal limits   CULTURE, BLOOD   CULTURE, BLOOD   TSH   LACTIC ACID, PLASMA   MAGNESIUM   URINALYSIS, REFLEX TO URINE CULTURE     EKG Readings: (Independently Interpreted)   Atrial fibrillation with RVR. Rate of 169. Mild diffuse ST depressions. LVH.     ECG Results          EKG 12-lead (In process)  Result time 12/11/19 11:57:48    In process by Interface, Lab In [facility] (12/11/19 11:57:48)                 Narrative:    Test Reason : R07.9,    Vent. Rate : 169 BPM     Atrial Rate : 174 BPM     P-R Int : 000 ms          QRS Dur : 080 ms      QT Int : 280 ms       P-R-T Axes : 000 010 031 degrees     QTc Int : 469 ms    Atrial fibrillation with rapid ventricular response  Moderate voltage criteria for LVH, may be normal variant  Nonspecific T wave abnormality  Abnormal ECG      Referred By: [email or web address]   SELF           Confirmed By:                             Imaging Results          X-Ray Chest AP Portable (Final result)  Result time 12/11/19 10:23:24    Final result by Stephane Francisco MD (12/11/19 10:23:24)                 Impression:       Bilateral, right greater than left, pulmonary infiltrates as detailed.  Clinical correlation.      Electronically signed by: Stephane Francisco  Date:    12/11/2019  Time:    10:23             Narrative:    EXAMINATION:  XR CHEST AP PORTABLE    CLINICAL HISTORY:  Chest Pain;    TECHNIQUE:  Single frontal view of the chest was performed.    COMPARISON:  March 9, 2009    FINDINGS:  Upper normal heart size.  Arch calcification.  Normal pulmonary vasculature.  Patchy though extensive right perihilar, more so right mid and upper lung zone confluent interstitial and airspace infiltrates as well as left perihilar and left retrocardiac interstitial infiltrates.  Findings are of most concern for bilateral pneumonia, less so changes of asymmetric pulmonary edema.  Clinical correlation.  Intact bony structures.  EKG leads superimposed chest.                              X-Rays:   Independently Interpreted Readings:   Chest X-Ray: Bilateral, right greater than left, infiltrates.     Medical Decision Making:   History:   Old Medical Records: I decided to obtain old medical records.  Initial Assessment:   49 y.o. male with recent cough referred form Urgent Care with new onset A-fib with RVR. Plan labs including cardiac enzymes, TSH, IV fluid bolus, and recontrol.  Differential Diagnosis:   SVT, atrial fibrillation, atrial flutter, ventricular arrhythmia, heart block, MI, orthostatic hypotension, sinus tachycardia, sensitive carotid sinus, stimulant abuse or side effect, electrolyte abnormalities, hyperthyroidism, anxiety.  Independently Interpreted Test(s):   I have ordered and independently interpreted X-rays - see prior notes.  I have ordered and independently interpreted EKG Reading(s) - see prior notes  Clinical Tests:   Lab Tests: Ordered and Reviewed  Radiological Study: Ordered and Reviewed  Medical Tests: Ordered and Reviewed            Scribe Attestation:   Scribe #1: I performed the above scribed service and the  documentation accurately describes the services I performed. I attest to the accuracy of the note.    Attending Attestation:           Physician Attestation for Scribe:  Physician Attestation Statement for Scribe #1: I, Dr. Mesa, reviewed documentation, as scribed by Natividad Rodríguez in my presence, and it is both accurate and complete.                 ED Course as of Dec 11 1222   Wed Dec 11, 2019   1151 Paged hospitalist.    [AC]   1220 Discussed case with Dr. Vargas, and will admit patient to his service.    [AC]      ED Course User Index  [AC] Natividad Rodríguez                Clinical Impression:     1. Pneumonia of both lungs due to infectious organism, unspecified part of lung    2. Atrial fibrillation with RVR    3. Atrial fibrillation with rapid ventricular response           Disposition:   Disposition: Admitted  Condition: Elizabeth Mesa MD  12/13/19 0834

## 2019-12-11 NOTE — ASSESSMENT & PLAN NOTE
-On admit he was euthermic with normal lactic acid but leukocytosis and evidence of bilateral pneumonia vs pulmonary edema   -Blood cultures obtained in ER and he received rocephin and azithromycin in ER  -Will check sputum culture and procalcitonin  -Will treat with levaquin, tessalon perles and guaifenesin for now.  -Lungs are clear so will avoid beta-agonist nebs given afib.  -Do not think he is septic, rather hypotension likely due to lopressor and diltiazem

## 2019-12-11 NOTE — ED NOTES
Pt  Had bowel movement on returning to bed  bp trending down continues. md informed of heart rate and bp. Will continue to monitor. Pt aaox3 skin warm and dry

## 2019-12-11 NOTE — SUBJECTIVE & OBJECTIVE
Past Medical History:   Diagnosis Date    Hypertension        Past Surgical History:   Procedure Laterality Date    HERNIA REPAIR         Review of patient's allergies indicates:  No Known Allergies    No current facility-administered medications on file prior to encounter.      No current outpatient medications on file prior to encounter.     Family History     Problem Relation (Age of Onset)    No Known Problems Mother        Tobacco Use    Smoking status: Never Smoker   Substance and Sexual Activity    Alcohol use: Never     Frequency: Never    Drug use: Never    Sexual activity: Not Currently     Review of Systems   Constitutional: Negative for activity change, appetite change and fever.   HENT: Negative for congestion and dental problem.    Eyes: Negative for discharge and itching.   Respiratory: Positive for cough. Negative for apnea, chest tightness and shortness of breath.    Cardiovascular: Negative for chest pain and leg swelling.   Gastrointestinal: Negative for abdominal distention and abdominal pain.   Endocrine: Negative for cold intolerance and heat intolerance.   Genitourinary: Negative for difficulty urinating and dysuria.   Musculoskeletal: Negative for arthralgias and back pain.   Allergic/Immunologic: Negative for environmental allergies and food allergies.   Neurological: Positive for weakness and light-headedness. Negative for dizziness and facial asymmetry.   Hematological: Negative for adenopathy. Does not bruise/bleed easily.   Psychiatric/Behavioral: Negative for agitation and behavioral problems.     Objective:     Vital Signs (Most Recent):  Temp: 99.3 °F (37.4 °C) (12/11/19 1515)  Pulse: (!) 132 (12/11/19 1630)  Resp: (!) 25 (12/11/19 1630)  BP: (!) 121/104 (12/11/19 1630)  SpO2: 97 % (12/11/19 1630) Vital Signs (24h Range):  Temp:  [98.2 °F (36.8 °C)-99.3 °F (37.4 °C)] 99.3 °F (37.4 °C)  Pulse:  [] 132  Resp:  [16-32] 25  SpO2:  [83 %-100 %] 97 %  BP: ()/()  121/104     Weight: 65.5 kg (144 lb 6.4 oz)  Body mass index is 21.96 kg/m².    Physical Exam   Constitutional: He is oriented to person, place, and time. He appears well-developed.   Thin man with a mildly toxic appearance   HENT:   Head: Normocephalic and atraumatic.   Eyes: Pupils are equal, round, and reactive to light. EOM are normal.   Neck: Normal range of motion. Neck supple.   Cardiovascular:   Irregularly irregular, tachycardic, no murmurs   Pulmonary/Chest: Effort normal and breath sounds normal. No respiratory distress.   Abdominal: Soft. Bowel sounds are normal. He exhibits no distension. There is no tenderness.   Musculoskeletal: Normal range of motion. He exhibits no edema.   Neurological: He is oriented to person, place, and time. No cranial nerve deficit. Coordination normal.   Skin: Skin is warm and dry.   Psychiatric: He has a normal mood and affect. His behavior is normal.   Vitals reviewed.        CRANIAL NERVES     CN III, IV, VI   Pupils are equal, round, and reactive to light.  Extraocular motions are normal.        Significant Labs: All pertinent labs within the past 24 hours have been reviewed.    Significant Imaging: I have reviewed and interpreted all pertinent imaging results/findings within the past 24 hours.

## 2019-12-12 PROBLEM — J18.9 PNEUMONIA OF BOTH LUNGS DUE TO INFECTIOUS ORGANISM: Status: ACTIVE | Noted: 2019-12-12

## 2019-12-12 LAB
ALBUMIN SERPL BCP-MCNC: 3.1 G/DL (ref 3.5–5.2)
ALP SERPL-CCNC: 74 U/L (ref 55–135)
ALT SERPL W/O P-5'-P-CCNC: 44 U/L (ref 10–44)
ANION GAP SERPL CALC-SCNC: 10 MMOL/L (ref 8–16)
ANION GAP SERPL CALC-SCNC: 10 MMOL/L (ref 8–16)
ASCENDING AORTA: 3.14 CM
AST SERPL-CCNC: 29 U/L (ref 10–40)
AV INDEX (PROSTH): 0.73
AV MEAN GRADIENT: 2 MMHG
AV PEAK GRADIENT: 3 MMHG
AV VALVE AREA: 2.32 CM2
AV VELOCITY RATIO: 0.77
BASOPHILS # BLD AUTO: 0.05 K/UL (ref 0–0.2)
BASOPHILS NFR BLD: 0.5 % (ref 0–1.9)
BILIRUB SERPL-MCNC: 1 MG/DL (ref 0.1–1)
BSA FOR ECHO PROCEDURE: 1.77 M2
BUN SERPL-MCNC: 10 MG/DL (ref 6–20)
BUN SERPL-MCNC: 10 MG/DL (ref 6–20)
CALCIUM SERPL-MCNC: 8.9 MG/DL (ref 8.7–10.5)
CALCIUM SERPL-MCNC: 8.9 MG/DL (ref 8.7–10.5)
CHLORIDE SERPL-SCNC: 108 MMOL/L (ref 95–110)
CHLORIDE SERPL-SCNC: 108 MMOL/L (ref 95–110)
CO2 SERPL-SCNC: 23 MMOL/L (ref 23–29)
CO2 SERPL-SCNC: 23 MMOL/L (ref 23–29)
CORTIS SERPL-MCNC: 22.2 UG/DL
CREAT SERPL-MCNC: 1.1 MG/DL (ref 0.5–1.4)
CREAT SERPL-MCNC: 1.1 MG/DL (ref 0.5–1.4)
CV ECHO LV RWT: 0.4 CM
DIFFERENTIAL METHOD: ABNORMAL
DOP CALC AO PEAK VEL: 0.81 M/S
DOP CALC AO VTI: 13.25 CM
DOP CALC LVOT AREA: 3.2 CM2
DOP CALC LVOT DIAMETER: 2.02 CM
DOP CALC LVOT PEAK VEL: 0.62 M/S
DOP CALC LVOT STROKE VOLUME: 30.78 CM3
DOP CALCLVOT PEAK VEL VTI: 9.61 CM
E WAVE DECELERATION TIME: 152.34 MSEC
E/A RATIO: 1.79
E/E' RATIO: 4 M/S
ECHO LV POSTERIOR WALL: 1.05 CM (ref 0.6–1.1)
EOSINOPHIL # BLD AUTO: 0 K/UL (ref 0–0.5)
EOSINOPHIL NFR BLD: 0.1 % (ref 0–8)
ERYTHROCYTE [DISTWIDTH] IN BLOOD BY AUTOMATED COUNT: 15.8 % (ref 11.5–14.5)
EST. GFR  (AFRICAN AMERICAN): >60 ML/MIN/1.73 M^2
EST. GFR  (AFRICAN AMERICAN): >60 ML/MIN/1.73 M^2
EST. GFR  (NON AFRICAN AMERICAN): >60 ML/MIN/1.73 M^2
EST. GFR  (NON AFRICAN AMERICAN): >60 ML/MIN/1.73 M^2
FRACTIONAL SHORTENING: 8 % (ref 28–44)
GLUCOSE SERPL-MCNC: 97 MG/DL (ref 70–110)
GLUCOSE SERPL-MCNC: 97 MG/DL (ref 70–110)
HCT VFR BLD AUTO: 44.7 % (ref 40–54)
HGB BLD-MCNC: 14 G/DL (ref 14–18)
IMM GRANULOCYTES # BLD AUTO: 0.04 K/UL (ref 0–0.04)
IMM GRANULOCYTES NFR BLD AUTO: 0.4 % (ref 0–0.5)
INTERVENTRICULAR SEPTUM: 1.08 CM (ref 0.6–1.1)
IVRT: 0.1 MSEC
LA MAJOR: 5.43 CM
LA MINOR: 5.77 CM
LA WIDTH: 5.21 CM
LEFT ATRIUM SIZE: 5.28 CM
LEFT ATRIUM VOLUME INDEX: 73.5 ML/M2
LEFT ATRIUM VOLUME: 130.82 CM3
LEFT INTERNAL DIMENSION IN SYSTOLE: 4.84 CM (ref 2.1–4)
LEFT VENTRICLE DIASTOLIC VOLUME INDEX: 75.13 ML/M2
LEFT VENTRICLE DIASTOLIC VOLUME: 133.7 ML
LEFT VENTRICLE MASS INDEX: 121 G/M2
LEFT VENTRICLE SYSTOLIC VOLUME INDEX: 61.5 ML/M2
LEFT VENTRICLE SYSTOLIC VOLUME: 109.43 ML
LEFT VENTRICULAR INTERNAL DIMENSION IN DIASTOLE: 5.27 CM (ref 3.5–6)
LEFT VENTRICULAR MASS: 215.97 G
LV LATERAL E/E' RATIO: 4 M/S
LV SEPTAL E/E' RATIO: 4 M/S
LYMPHOCYTES # BLD AUTO: 0.7 K/UL (ref 1–4.8)
LYMPHOCYTES NFR BLD: 7.5 % (ref 18–48)
MAGNESIUM SERPL-MCNC: 1.9 MG/DL (ref 1.6–2.6)
MCH RBC QN AUTO: 27.2 PG (ref 27–31)
MCHC RBC AUTO-ENTMCNC: 31.3 G/DL (ref 32–36)
MCV RBC AUTO: 87 FL (ref 82–98)
MONOCYTES # BLD AUTO: 0.6 K/UL (ref 0.3–1)
MONOCYTES NFR BLD: 6 % (ref 4–15)
MV PEAK A VEL: 0.29 M/S
MV PEAK E VEL: 0.52 M/S
NEUTROPHILS # BLD AUTO: 8.1 K/UL (ref 1.8–7.7)
NEUTROPHILS NFR BLD: 85.5 % (ref 38–73)
NRBC BLD-RTO: 0 /100 WBC
PISA TR MAX VEL: 2.39 M/S
PLATELET # BLD AUTO: 186 K/UL (ref 150–350)
PMV BLD AUTO: 11.7 FL (ref 9.2–12.9)
POTASSIUM SERPL-SCNC: 3.9 MMOL/L (ref 3.5–5.1)
POTASSIUM SERPL-SCNC: 3.9 MMOL/L (ref 3.5–5.1)
PROT SERPL-MCNC: 6.4 G/DL (ref 6–8.4)
PV PEAK VELOCITY: 0.57 CM/S
RA MAJOR: 5.45 CM
RA PRESSURE: 8 MMHG
RA WIDTH: 4.22 CM
RBC # BLD AUTO: 5.14 M/UL (ref 4.6–6.2)
RIGHT VENTRICULAR END-DIASTOLIC DIMENSION: 3.87 CM
SINUS: 3.3 CM
SODIUM SERPL-SCNC: 141 MMOL/L (ref 136–145)
SODIUM SERPL-SCNC: 141 MMOL/L (ref 136–145)
STJ: 2.67 CM
TDI LATERAL: 0.13 M/S
TDI SEPTAL: 0.13 M/S
TDI: 0.13 M/S
TR MAX PG: 23 MMHG
TRICUSPID ANNULAR PLANE SYSTOLIC EXCURSION: 1.73 CM
TV REST PULMONARY ARTERY PRESSURE: 31 MMHG
WBC # BLD AUTO: 9.44 K/UL (ref 3.9–12.7)

## 2019-12-12 PROCEDURE — 99233 PR SUBSEQUENT HOSPITAL CARE,LEVL III: ICD-10-PCS | Mod: ,,, | Performed by: HOSPITALIST

## 2019-12-12 PROCEDURE — 63600175 PHARM REV CODE 636 W HCPCS: Performed by: HOSPITALIST

## 2019-12-12 PROCEDURE — 83735 ASSAY OF MAGNESIUM: CPT

## 2019-12-12 PROCEDURE — 99233 SBSQ HOSP IP/OBS HIGH 50: CPT | Mod: ,,, | Performed by: HOSPITALIST

## 2019-12-12 PROCEDURE — 94761 N-INVAS EAR/PLS OXIMETRY MLT: CPT

## 2019-12-12 PROCEDURE — 37000009 HC ANESTHESIA EA ADD 15 MINS: Performed by: INTERNAL MEDICINE

## 2019-12-12 PROCEDURE — 82533 TOTAL CORTISOL: CPT

## 2019-12-12 PROCEDURE — 36000706: Performed by: INTERNAL MEDICINE

## 2019-12-12 PROCEDURE — 36415 COLL VENOUS BLD VENIPUNCTURE: CPT

## 2019-12-12 PROCEDURE — 37000008 HC ANESTHESIA 1ST 15 MINUTES: Performed by: INTERNAL MEDICINE

## 2019-12-12 PROCEDURE — 85025 COMPLETE CBC W/AUTO DIFF WBC: CPT

## 2019-12-12 PROCEDURE — 11000001 HC ACUTE MED/SURG PRIVATE ROOM

## 2019-12-12 PROCEDURE — 36000707: Performed by: INTERNAL MEDICINE

## 2019-12-12 PROCEDURE — 25000003 PHARM REV CODE 250: Performed by: HOSPITALIST

## 2019-12-12 PROCEDURE — 25000003 PHARM REV CODE 250: Performed by: INTERNAL MEDICINE

## 2019-12-12 PROCEDURE — 80053 COMPREHEN METABOLIC PANEL: CPT

## 2019-12-12 PROCEDURE — 63600175 PHARM REV CODE 636 W HCPCS: Performed by: INTERNAL MEDICINE

## 2019-12-12 RX ORDER — DIPHENHYDRAMINE HCL 25 MG
25 CAPSULE ORAL
Status: DISCONTINUED | OUTPATIENT
Start: 2019-12-12 | End: 2019-12-12 | Stop reason: HOSPADM

## 2019-12-12 RX ORDER — SODIUM CHLORIDE 9 MG/ML
INJECTION, SOLUTION INTRAVENOUS CONTINUOUS
Status: DISCONTINUED | OUTPATIENT
Start: 2019-12-12 | End: 2019-12-12

## 2019-12-12 RX ORDER — ENALAPRIL MALEATE 2.5 MG/1
2.5 TABLET ORAL 2 TIMES DAILY
Status: DISCONTINUED | OUTPATIENT
Start: 2019-12-13 | End: 2019-12-12

## 2019-12-12 RX ORDER — ENALAPRIL MALEATE 2.5 MG/1
2.5 TABLET ORAL 2 TIMES DAILY
Status: DISCONTINUED | OUTPATIENT
Start: 2019-12-12 | End: 2019-12-13

## 2019-12-12 RX ORDER — POTASSIUM CHLORIDE 20 MEQ/1
20 TABLET, EXTENDED RELEASE ORAL 2 TIMES DAILY
Status: COMPLETED | OUTPATIENT
Start: 2019-12-12 | End: 2019-12-12

## 2019-12-12 RX ORDER — METOPROLOL TARTRATE 50 MG/1
50 TABLET ORAL 2 TIMES DAILY
Status: DISCONTINUED | OUTPATIENT
Start: 2019-12-12 | End: 2019-12-14 | Stop reason: HOSPADM

## 2019-12-12 RX ORDER — FUROSEMIDE 10 MG/ML
40 INJECTION INTRAMUSCULAR; INTRAVENOUS ONCE
Status: COMPLETED | OUTPATIENT
Start: 2019-12-12 | End: 2019-12-12

## 2019-12-12 RX ADMIN — FUROSEMIDE 40 MG: 10 INJECTION, SOLUTION INTRAMUSCULAR; INTRAVENOUS at 09:12

## 2019-12-12 RX ADMIN — GUAIFENESIN 600 MG: 600 TABLET, EXTENDED RELEASE ORAL at 09:12

## 2019-12-12 RX ADMIN — LEVOFLOXACIN 750 MG: 750 INJECTION, SOLUTION INTRAVENOUS at 05:12

## 2019-12-12 RX ADMIN — METOPROLOL TARTRATE 50 MG: 50 TABLET ORAL at 09:12

## 2019-12-12 RX ADMIN — APIXABAN 5 MG: 2.5 TABLET, FILM COATED ORAL at 09:12

## 2019-12-12 RX ADMIN — ENALAPRIL MALEATE 2.5 MG: 2.5 TABLET ORAL at 09:12

## 2019-12-12 RX ADMIN — POTASSIUM CHLORIDE 20 MEQ: 1500 TABLET, EXTENDED RELEASE ORAL at 09:12

## 2019-12-12 RX ADMIN — DILTIAZEM HYDROCHLORIDE 14 MG/HR: 5 INJECTION INTRAVENOUS at 12:12

## 2019-12-12 RX ADMIN — POTASSIUM CHLORIDE 20 MEQ: 1500 TABLET, EXTENDED RELEASE ORAL at 12:12

## 2019-12-12 NOTE — ASSESSMENT & PLAN NOTE
-Noted to be 875 on admit  -Likely due to transient diminished cardiac output from afib/rvr vs hypertensive cardiomyopathy  -Has been started on metoprolol and enalipril  -Await echo today.  -Continue strict ins/outs and daily weights.  -Dr. Smith on board and input appreciated.

## 2019-12-12 NOTE — PROGRESS NOTES
"[facility] Hospital Medicine  Progress Note    Patient Name: Ilya Thomas  MRN: [id number]  Patient Class: IP- Inpatient   Admission Date: 12/11/2019  Length of Stay: 1 days  Attending Physician: Nicholas Vargas MD  Primary Care Provider: Primary Doctor No        Subjective:     Principal Problem:Atrial fibrillation with RVR        HPI:  Mr. Thomas is a 49 year old man with history of hypertension and medication non-compliance who came in for evaluation of racing heart and codl symptoms for three days.  He states he has had a dry cough for the last three days and took cough syrup with minimal relief.  He denies fever, chills, headache, myalgias, body aches, chest pain and fever.  His mother compliments the history and ntoes that last night he seemed to be coughing more and was working a bit harder to breath.  They went to urgent care this morning and he was noted to have a pulse of 167.  EKG was obtained which showed atrial fibrillation with rvr and he was referred to our ER.  He notes that he could "adams feel" that his heart rate was going fast.  His mother interjects that he was very weak and light headed this morning.  In the ER he was initially hypertensive and found to have afib with rvr.  He was given lopressor with minimal results and then given diltiazem and his hr quickly improved to the 90s.  Subsequently his blood pressure dropped into the upper 80s, but he continued without symptoms of light headedness or shortness of breath even when raising himself up for exam.  He is admitted to the ICU with low suspicious of sepsis for close monitoring, cardizem drip and IV antibiotics for pneumonia.    Overview/Hospital Course:  No notes on file    Interval History: NO acute events overnight.  Converted to NSR this morning.  Feels much better.    Review of Systems   Constitutional: Negative for activity change, appetite change and fever.   HENT: Negative for congestion and dental problem.  "   Eyes: Negative for discharge and itching.   Respiratory: Positive for cough. Negative for apnea, chest tightness and shortness of breath.    Cardiovascular: Negative for chest pain and leg swelling.   Gastrointestinal: Negative for abdominal distention and abdominal pain.   Endocrine: Negative for cold intolerance and heat intolerance.   Genitourinary: Negative for difficulty urinating and dysuria.   Musculoskeletal: Negative for arthralgias and back pain.   Allergic/Immunologic: Negative for environmental allergies and food allergies.   Neurological: Negative for dizziness, facial asymmetry, weakness and light-headedness.   Hematological: Negative for adenopathy. Does not bruise/bleed easily.   Psychiatric/Behavioral: Negative for agitation and behavioral problems.     Objective:     Vital Signs (Most Recent):  Temp: 98.6 °F (37 °C) (12/12/19 1230)  Pulse: 80 (12/12/19 1200)  Resp: (!) 28 (12/12/19 1200)  BP: 127/77 (12/12/19 1200)  SpO2: 96 % (12/12/19 1200) Vital Signs (24h Range):  Temp:  [98.6 °F (37 °C)-100.1 °F (37.8 °C)] 98.6 °F (37 °C)  Pulse:  [] 80  Resp:  [21-46] 28  SpO2:  [90 %-100 %] 96 %  BP: ()/() 127/77     Weight: 65.5 kg (144 lb 6.4 oz)  Body mass index is 21.96 kg/m².    Intake/Output Summary (Last 24 hours) at 12/12/2019 1259  Last data filed at 12/12/2019 1200  Gross per 24 hour   Intake 505.33 ml   Output 3450 ml   Net -2944.67 ml      Physical Exam   Constitutional: He is oriented to person, place, and time. He appears well-developed.   Thin man with a non-toxic appearance   HENT:   Head: Normocephalic and atraumatic.   Eyes: Pupils are equal, round, and reactive to light. EOM are normal.   Neck: Normal range of motion. Neck supple.   Cardiovascular: Normal rate, regular rhythm and normal heart sounds.   Pulmonary/Chest: Effort normal and breath sounds normal. No respiratory distress.   Abdominal: Soft. Bowel sounds are normal. He exhibits no distension. There is no  tenderness.   Musculoskeletal: Normal range of motion. He exhibits no edema.   Neurological: He is oriented to person, place, and time. No cranial nerve deficit. Coordination normal.   Skin: Skin is warm and dry.   Psychiatric: He has a normal mood and affect. His behavior is normal.   Vitals reviewed.      Significant Labs: All pertinent labs within the past 24 hours have been reviewed.    Significant Imaging: I have reviewed and interpreted all pertinent imaging results/findings within the past 24 hours.      Assessment/Plan:      * Atrial fibrillation with RVR  -Mr. Thomas was admitted to inpatient status in our ICU  -No prior cardiac history noted.  TSH is normal.  Troponin minimally elevated.  No chest pain.  Has history of untreated htn.  -Suspect secondary to coughing and respiratory illness vs hypertensive cardiomyopathy.  -Did not respond to well to lopressor in ER.  HR transiently improved after receiving diltiazem 10 but his blood pressure did drop  -Troponins minimally elevated and down trending most likely due to demand ischemia.  -Treated with cardizem drip overnight and converted to NSR today.  -[id number] score of 1-2 for hypertension and possible chf.  -Patient has been started on metoprolol and apixaban by Dr. Smith and I discussed the case with him today.  -Plan echo today.  -Transfer out of ICU today      Community acquired pneumonia of right lung  -On admit he was euthermic with normal lactic acid but leukocytosis and evidence of bilateral pneumonia vs pulmonary edema   -In ER blood cultures were obtained and he received rocephin and azithromycin.  Was noted to be hypotensive in ER likely due to lopressor and diltiazem and not sepsis.  -Procalcitonin is normal.  -Cultures negative so far.  -Has been treated with IV levaquin.  -I suspect his pulmonary infiltrates were most likely pulmonary edema and not pneumonia.  Will continue levaquin one more day and if continues to do so well  anticipate stopping antibiotics tomorrow.    Hypotension  -Suspect due to lopressor and diltiazem in ER and not sepsis, but cannot be certain so will monitor in ICU.  -AM cortisol normal  Troponins minimally elevated and down-trending.  -Await echo today  -BP is well controlled and normal today.    Elevated brain natriuretic peptide (BNP) level  -Noted to be 875 on admit  -Likely due to transient diminished cardiac output from afib/rvr vs hypertensive cardiomyopathy  -Has been started on metoprolol and enalipril  -Await echo today.  -Continue strict ins/outs and daily weights.  -Dr. Smith on board and input appreciated.      Elevated troponin  -Likely secondary to demand ischemia from afib/rvr  -No chest pain and no obvious ischemic changes on EKG at this time.  -Troponins minimally elevated and trended down.  -Treatment as above.    Essential hypertension  -Diagnosed several years ago, but has not been on treatment for at least three years.  -EKG shows likely LVH  -Hypertensive on admit but became hypotensive after receiving lopressor and diltiazem  -Monitored closely in ICU and BP now well controlled on metoprolol and starting low dose enalipril.        VTE Risk Mitigation (From admission, onward)         Ordered     apixaban tablet 5 mg  2 times daily      12/12/19 0913     IP VTE LOW RISK PATIENT  Once      12/11/19 1511     Place CINTIA horowitze  Until discontinued      12/11/19 1511                      Nicholas Vargas MD  Department of Hospital Medicine   [facility]

## 2019-12-12 NOTE — PLAN OF CARE
Pt presented with no spiritual distress, and stated he was fine with his care and recovery.  Offered spiritual care in future as pt might desire it.

## 2019-12-12 NOTE — ASSESSMENT & PLAN NOTE
-Mr. Thomas was admitted to inpatient status in our ICU  -No prior cardiac history noted.  TSH is normal.  Troponin minimally elevated.  No chest pain.  Has history of untreated htn.  -Suspect secondary to coughing and respiratory illness vs hypertensive cardiomyopathy.  -Did not respond to well to lopressor in ER.  HR transiently improved after receiving diltiazem 10 but his blood pressure did drop  -Troponins minimally elevated and down trending most likely due to demand ischemia.  -Treated with cardizem drip overnight and converted to NSR today.  -[id number] score of 1-2 for hypertension and possible chf.  -Patient has been started on metoprolol and apixaban by Dr. Smith and I discussed the case with him today.  -Plan echo today.  -Transfer out of ICU today

## 2019-12-12 NOTE — NURSING
Received patient to room 382 from ICU. Pt transferred via wheelchair to bed per self with steady gait noted.Pt AAO x 4. Resp. Even and unlabored. Pt denies any c/o discomfort. Pt's heart rate in the 80's SR. Oriented to room. VSS. Safety maintained. Bed in lowest position and locked. Call light in reach.

## 2019-12-12 NOTE — ASSESSMENT & PLAN NOTE
-Diagnosed several years ago, but has not been on treatment for at least three years.  -EKG shows likely LVH  -Hypertensive on admit but became hypotensive after receiving lopressor and diltiazem  -Monitored closely in ICU and BP now well controlled on metoprolol and starting low dose enalipril.

## 2019-12-12 NOTE — ASSESSMENT & PLAN NOTE
-On admit he was euthermic with normal lactic acid but leukocytosis and evidence of bilateral pneumonia vs pulmonary edema   -In ER blood cultures were obtained and he received rocephin and azithromycin.  Was noted to be hypotensive in ER likely due to lopressor and diltiazem and not sepsis.  -Procalcitonin is normal.  -Cultures negative so far.  -Has been treated with IV levaquin.  -I suspect his pulmonary infiltrates were most likely pulmonary edema and not pneumonia.  Will continue levaquin one more day and if continues to do so well anticipate stopping antibiotics tomorrow.

## 2019-12-12 NOTE — ASSESSMENT & PLAN NOTE
-Suspect due to lopressor and diltiazem in ER and not sepsis, but cannot be certain so will monitor in ICU.  -AM cortisol normal  Troponins minimally elevated and down-trending.  -Await echo today  -BP is well controlled and normal today.

## 2019-12-12 NOTE — ASSESSMENT & PLAN NOTE
-Likely secondary to demand ischemia from afib/rvr  -No chest pain and no obvious ischemic changes on EKG at this time.  -Troponins minimally elevated and trended down.  -Treatment as above.

## 2019-12-12 NOTE — SUBJECTIVE & OBJECTIVE
Interval History: NO acute events overnight.  Converted to NSR this morning.  Feels much better.    Review of Systems   Constitutional: Negative for activity change, appetite change and fever.   HENT: Negative for congestion and dental problem.    Eyes: Negative for discharge and itching.   Respiratory: Positive for cough. Negative for apnea, chest tightness and shortness of breath.    Cardiovascular: Negative for chest pain and leg swelling.   Gastrointestinal: Negative for abdominal distention and abdominal pain.   Endocrine: Negative for cold intolerance and heat intolerance.   Genitourinary: Negative for difficulty urinating and dysuria.   Musculoskeletal: Negative for arthralgias and back pain.   Allergic/Immunologic: Negative for environmental allergies and food allergies.   Neurological: Negative for dizziness, facial asymmetry, weakness and light-headedness.   Hematological: Negative for adenopathy. Does not bruise/bleed easily.   Psychiatric/Behavioral: Negative for agitation and behavioral problems.     Objective:     Vital Signs (Most Recent):  Temp: 98.6 °F (37 °C) (12/12/19 1230)  Pulse: 80 (12/12/19 1200)  Resp: (!) 28 (12/12/19 1200)  BP: 127/77 (12/12/19 1200)  SpO2: 96 % (12/12/19 1200) Vital Signs (24h Range):  Temp:  [98.6 °F (37 °C)-100.1 °F (37.8 °C)] 98.6 °F (37 °C)  Pulse:  [] 80  Resp:  [21-46] 28  SpO2:  [90 %-100 %] 96 %  BP: ()/() 127/77     Weight: 65.5 kg (144 lb 6.4 oz)  Body mass index is 21.96 kg/m².    Intake/Output Summary (Last 24 hours) at 12/12/2019 1259  Last data filed at 12/12/2019 1200  Gross per 24 hour   Intake 505.33 ml   Output 3450 ml   Net -2944.67 ml      Physical Exam   Constitutional: He is oriented to person, place, and time. He appears well-developed.   Thin man with a non-toxic appearance   HENT:   Head: Normocephalic and atraumatic.   Eyes: Pupils are equal, round, and reactive to light. EOM are normal.   Neck: Normal range of motion. Neck  supple.   Cardiovascular: Normal rate, regular rhythm and normal heart sounds.   Pulmonary/Chest: Effort normal and breath sounds normal. No respiratory distress.   Abdominal: Soft. Bowel sounds are normal. He exhibits no distension. There is no tenderness.   Musculoskeletal: Normal range of motion. He exhibits no edema.   Neurological: He is oriented to person, place, and time. No cranial nerve deficit. Coordination normal.   Skin: Skin is warm and dry.   Psychiatric: He has a normal mood and affect. His behavior is normal.   Vitals reviewed.      Significant Labs: All pertinent labs within the past 24 hours have been reviewed.    Significant Imaging: I have reviewed and interpreted all pertinent imaging results/findings within the past 24 hours.

## 2019-12-12 NOTE — CONSULTS
[facility]  Cardiology  Consult Note    Patient Name: Ilya Thomas  MRN: [id number]  Admission Date: 12/11/2019  Hospital Length of Stay: 0 days  Code Status: Full Code   Attending Provider: Nicholas Vargas MD   Consulting Provider: Lara Smith MD  Primary Care Physician: Primary Doctor No  Principal Problem:Atrial fibrillation with RVR    Patient information was obtained from patient.     Inpatient consult to Cardiology  Consult performed by: Lara Smith MD  Consult ordered by: Nicholas Vargas MD  Reason for consult: Atrial fibrillation        Subjective:     Chief Complaint:  Shortness of breath.     HPI:    Ilya Thomas is a 49 y.o.male with hypertension. About 12/8/2019 he noted that he was short ob breath with some coughing. Over the next few days he became increasingly short of breath and the night of 12/10/2019 he had difficulty lying down due to SOB. He was seen in  on 12/11/2019 and noted to be in atrial fibrillation with fast VRR. CXR reveals infiltrates consistent with pneumonia or heart failure. He was admitted. He feels palpitations but is quite uncertain for how long the heart has been racing.      Past Medical History:   Diagnosis Date    Hypertension        Past Surgical History:   Procedure Laterality Date    HERNIA REPAIR         Review of patient's allergies indicates:  No Known Allergies    No current facility-administered medications on file prior to encounter.      No current outpatient medications on file prior to encounter.     Family History     Problem Relation (Age of Onset)    No Known Problems Mother        Tobacco Use    Smoking status: Never Smoker   Substance and Sexual Activity    Alcohol use: Never     Frequency: Never    Drug use: Never    Sexual activity: Not Currently     Review of Systems   Constitution: Positive for fever. Negative for chills and malaise/fatigue.   HENT: Negative for nosebleeds.    Eyes: Negative for double vision,  vision loss in left eye and vision loss in right eye.   Cardiovascular: Positive for dyspnea on exertion, irregular heartbeat and palpitations. Negative for chest pain, claudication, leg swelling, near-syncope, orthopnea, paroxysmal nocturnal dyspnea and syncope.   Respiratory: Positive for cough and shortness of breath. Negative for hemoptysis and wheezing.    Endocrine: Negative for cold intolerance and heat intolerance.   Hematologic/Lymphatic: Negative for bleeding problem. Does not bruise/bleed easily.   Skin: Negative for color change and rash.   Musculoskeletal: Negative for back pain, falls, muscle weakness and myalgias.   Gastrointestinal: Negative for heartburn, hematemesis, hematochezia, hemorrhoids, jaundice, melena, nausea and vomiting.   Genitourinary: Negative for dysuria and hematuria.   Neurological: Negative for dizziness, focal weakness, headaches, light-headedness, loss of balance, numbness, vertigo and weakness.   Psychiatric/Behavioral: Negative for altered mental status, depression and memory loss. The patient is not nervous/anxious.    Allergic/Immunologic: Negative for hives and persistent infections.     Objective:     Vital Signs (Most Recent):  Temp: 99.3 °F (37.4 °C) (12/11/19 1515)  Pulse: (!) 132 (12/11/19 1830)  Resp: (!) 30 (12/11/19 1745)  BP: 123/80 (12/11/19 1830)  SpO2: 96 % (12/11/19 1830) Vital Signs (24h Range):  Temp:  [98.2 °F (36.8 °C)-99.3 °F (37.4 °C)] 99.3 °F (37.4 °C)  Pulse:  [] 132  Resp:  [16-32] 30  SpO2:  [83 %-100 %] 96 %  BP: ()/() 123/80     Weight: 65.5 kg (144 lb 6.4 oz)  Body mass index is 21.96 kg/m².    SpO2: 96 %  O2 Device (Oxygen Therapy): room air      Intake/Output Summary (Last 24 hours) at 12/11/2019 1841  Last data filed at 12/11/2019 1203  Gross per 24 hour   Intake 50 ml   Output --   Net 50 ml       Lines/Drains/Airways     Peripheral Intravenous Line                 Peripheral IV - Single Lumen 12/11/19 1002 20 G Left  Antecubital less than 1 day         Peripheral IV - Single Lumen 12/11/19 1233 3/4 in;20 G Left Hand less than 1 day                Physical Exam   Constitutional: He is oriented to person, place, and time. He appears well-developed and well-nourished.  Non-toxic appearance. No distress.   HENT:   Head: Normocephalic and atraumatic.   Nose: Nose normal.   Eyes: Right eye exhibits no discharge. Left eye exhibits no discharge. Right conjunctiva is not injected. Left conjunctiva is not injected. Right pupil is round. Left pupil is round. Pupils are equal.   Neck: Neck supple. No JVD present. Carotid bruit is not present. No thyromegaly present.   Cardiovascular: S1 normal and S2 normal. An irregularly irregular rhythm present.  No extrasystoles are present. Tachycardia present. PMI is not displaced. Exam reveals no gallop.   Pulses:       Radial pulses are 2+ on the right side, and 2+ on the left side.        Femoral pulses are 2+ on the right side, and 2+ on the left side.       Dorsalis pedis pulses are 2+ on the right side, and 2+ on the left side.        Posterior tibial pulses are 2+ on the right side, and 2+ on the left side.   Pulmonary/Chest: Effort normal. He has rhonchi in the right middle field and the left middle field. He has rales in the right lower field.   Abdominal: Soft. Normal appearance. There is no hepatosplenomegaly. There is no tenderness.   Musculoskeletal:        Right ankle: He exhibits no swelling, no ecchymosis and no deformity.        Left ankle: He exhibits no swelling, no ecchymosis and no deformity.   Lymphadenopathy:        Head (right side): No submandibular adenopathy present.        Head (left side): No submandibular adenopathy present.     He has no cervical adenopathy.   Neurological: He is alert and oriented to person, place, and time. He is not disoriented. No cranial nerve deficit.   Skin: Skin is warm, dry and intact. No rash noted. He is not diaphoretic. No cyanosis. Nails  show no clubbing.   Psychiatric: He has a normal mood and affect. His speech is normal and behavior is normal. Judgment and thought content normal. Cognition and memory are normal.     Current Medications:     enoxaparin  1 mg/kg Subcutaneous Q12H    furosemide  40 mg Intravenous BID    guaiFENesin  600 mg Oral BID    [START ON 12/12/2019] levoFLOXacin  750 mg Intravenous Q24H    [START ON 12/12/2019] metoprolol tartrate  25 mg Oral BID    potassium chloride  20 mEq Oral BID     Current Laboratory Results:    Recent Results (from the past 24 hour(s))   POCT Influenza A/B    Collection Time: 12/11/19  8:43 AM   Result Value Ref Range    Rapid Influenza A Ag Negative Negative    Rapid Influenza B Ag Negative Negative     Acceptable Yes    CBC auto differential    Collection Time: 12/11/19 10:04 AM   Result Value Ref Range    WBC 13.53 (H) 3.90 - 12.70 K/uL    RBC 5.52 4.60 - 6.20 M/uL    Hemoglobin 15.2 14.0 - 18.0 g/dL    Hematocrit 49.1 40.0 - 54.0 %    Mean Corpuscular Volume 89 82 - 98 fL    Mean Corpuscular Hemoglobin 27.5 27.0 - 31.0 pg    Mean Corpuscular Hemoglobin Conc 31.0 (L) 32.0 - 36.0 g/dL    RDW 15.8 (H) 11.5 - 14.5 %    Platelets 230 150 - 350 K/uL    MPV 12.7 9.2 - 12.9 fL    Immature Granulocytes 0.4 0.0 - 0.5 %    Gran # (ANC) 11.6 (H) 1.8 - 7.7 K/uL    Immature Grans (Abs) 0.06 (H) 0.00 - 0.04 K/uL    Lymph # 1.1 1.0 - 4.8 K/uL    Mono # 0.7 0.3 - 1.0 K/uL    Eos # 0.0 0.0 - 0.5 K/uL    Baso # 0.05 0.00 - 0.20 K/uL    nRBC 0 0 /100 WBC    Gran% 85.9 (H) 38.0 - 73.0 %    Lymph% 8.2 (L) 18.0 - 48.0 %    Mono% 4.9 4.0 - 15.0 %    Eosinophil% 0.2 0.0 - 8.0 %    Basophil% 0.4 0.0 - 1.9 %    Differential Method Automated    Comprehensive metabolic panel    Collection Time: 12/11/19 10:04 AM   Result Value Ref Range    Sodium 141 136 - 145 mmol/L    Potassium 4.0 3.5 - 5.1 mmol/L    Chloride 106 95 - 110 mmol/L    CO2 25 23 - 29 mmol/L    Glucose 112 (H) 70 - 110 mg/dL    BUN, Bld 14  6 - 20 mg/dL    Creatinine 1.3 0.5 - 1.4 mg/dL    Calcium 9.4 8.7 - 10.5 mg/dL    Total Protein 7.2 6.0 - 8.4 g/dL    Albumin 3.5 3.5 - 5.2 g/dL    Total Bilirubin 0.9 0.1 - 1.0 mg/dL    Alkaline Phosphatase 84 55 - 135 U/L    AST 35 10 - 40 U/L    ALT 44 10 - 44 U/L    Anion Gap 10 8 - 16 mmol/L    eGFR if African American >60 >60 mL/min/1.73 m^2    eGFR if non African American >60 >60 mL/min/1.73 m^2   Troponin I #1    Collection Time: 12/11/19 10:04 AM   Result Value Ref Range    Troponin I 0.060 (H) 0.000 - 0.026 ng/mL   B-Type natriuretic peptide (BNP)    Collection Time: 12/11/19 10:04 AM   Result Value Ref Range     (H) 0 - 99 pg/mL   TSH    Collection Time: 12/11/19 10:04 AM   Result Value Ref Range    TSH 1.720 0.400 - 4.000 uIU/mL   Troponin I #2    Collection Time: 12/11/19 11:15 AM   Result Value Ref Range    Troponin I 0.037 (H) 0.000 - 0.026 ng/mL   Lactic acid, plasma    Collection Time: 12/11/19 11:15 AM   Result Value Ref Range    Lactate (Lactic Acid) 1.5 0.5 - 2.2 mmol/L   Magnesium    Collection Time: 12/11/19 11:15 AM   Result Value Ref Range    Magnesium 1.8 1.6 - 2.6 mg/dL   Troponin I    Collection Time: 12/11/19  1:10 PM   Result Value Ref Range    Troponin I 0.039 (H) 0.000 - 0.026 ng/mL   Procalcitonin    Collection Time: 12/11/19  2:23 PM   Result Value Ref Range    Procalcitonin 0.19 <0.25 ng/mL   Troponin I    Collection Time: 12/11/19  3:52 PM   Result Value Ref Range    Troponin I 0.054 (H) 0.000 - 0.026 ng/mL   PT/INR    Collection Time: 12/11/19  3:52 PM   Result Value Ref Range    Prothrombin Time 11.4 9.0 - 12.5 sec    INR 1.0 0.8 - 1.2   D dimer, quantitative    Collection Time: 12/11/19  3:52 PM   Result Value Ref Range    D-Dimer 1.08 (H) <0.50 mg/L FEU     Current Imaging Results:    X-Ray Chest AP Portable   Final Result      Bilateral, right greater than left, pulmonary infiltrates as detailed.  Clinical correlation.         Electronically signed by: Stephane Francisco    Date:    12/11/2019   Time:    10:23          Assessment and Plan:     Active Diagnoses:    Diagnosis Date Noted POA    PRINCIPAL PROBLEM:  Atrial fibrillation with RVR [I48.91] 12/11/2019 Yes    Community acquired pneumonia of right lung [J18.9] 12/11/2019 Yes    Hypotension [I95.9] 12/11/2019 Yes    Elevated brain natriuretic peptide (BNP) level [R79.89] 12/11/2019 Yes    Elevated troponin [R79.89] 12/11/2019 Yes    Essential hypertension [I10] 12/11/2019 Yes      Problems Resolved During this Admission:     Assessment and Plan:    1. Atrial Fibrillation   12/8/2019: Suspect onset of atrial fibrillation.   12/11/2019: Diagnosed with persistent atrial fibrillation.   [id number] ([facility]).   On diltiazem iv.   Anticoagulation with enoxaparin 1 mg/kg sc Q12.   Metoprolol 25 mg Q6.   Echo.   12/12/2019: Plan YULISSA guided CV.    2. Heart Failure   Suspect Diastolic, Acute due to hypertension and AF.   Diuresis.    3. Hypertension   2015: Diagnosed.   Treated for a year before he ran out of medications.    4. Pneumonia   On antibiotics iv.     5. Primary Care   Not established.    The planned procedure was discussed in detail with the patient and the family members present. Risk, benefit and alternatives were reviewed. All questions answered. Consent was then signed.    If further questions or concerns arise the patient was encouraged to contact me prior to the planned procedure.         VTE Risk Mitigation (From admission, onward)         Ordered     enoxaparin injection 70 mg  Every 12 hours      12/11/19 1841     IP VTE LOW RISK PATIENT  Once      12/11/19 1511     Place CINTIA hose  Until discontinued      12/11/19 1511                Thank you for your consult.    I will follow-up with patient. Please contact us if you have any additional questions.    Lara Smith MD  Cardiology   [facility]

## 2019-12-12 NOTE — PROGRESS NOTES
[facility] Cath Lab [facility] 1  Cardiology  Progress Note    Patient Name: Ilya Thomas  MRN: [id number]  Admission Date: 12/11/2019  Hospital Length of Stay: 1 days  Code Status: Full Code   Attending Physician: Nicholas Vargas MD   Primary Care Physician: Primary Doctor No  Expected Discharge Date:   Principal Problem:Atrial fibrillation with RVR    Subjective:     Brief HPI:    Ilya Thomas is a 49 y.o.male with hypertension. About 12/8/2019 he noted that he was short ob breath with some coughing. Over the next few days he became increasingly short of breath and the night of 12/10/2019 he had difficulty lying down due to SOB. He was seen in  on 12/11/2019 and noted to be in atrial fibrillation with fast VRR. CXR reveals infiltrates consistent with pneumonia or heart failure. He was admitted. He felt palpitations but is quite uncertain for how long his heart has been racing.       Hospital Course:     12/12/2019: Converted to sinus rhythm.    Interval History:    Converted to sinus rhythm this am. Breathing easier.    Review of Systems   Constitution: Negative for chills, fever and malaise/fatigue.   HENT: Negative for nosebleeds.    Eyes: Negative for vision loss in left eye and vision loss in right eye.   Cardiovascular: Negative for chest pain, leg swelling, orthopnea, palpitations and paroxysmal nocturnal dyspnea.   Respiratory: Negative for cough, hemoptysis, shortness of breath, sputum production and wheezing.    Hematologic/Lymphatic: Negative for bleeding problem.   Skin: Negative for rash.   Musculoskeletal: Negative for myalgias.   Gastrointestinal: Negative for abdominal pain, heartburn, hematemesis, hematochezia, jaundice, melena, nausea and vomiting.   Genitourinary: Negative for hematuria.   Neurological: Negative for dizziness, headaches, light-headedness, vertigo and weakness.   Psychiatric/Behavioral: Negative for altered mental status. The patient is not nervous/anxious.     Allergic/Immunologic: Negative for persistent infections.     Objective:     Vital Signs (Most Recent):  Temp: 99.3 °F (37.4 °C) (12/12/19 0715)  Pulse: 98 (12/12/19 0850)  Resp: (!) 27 (12/12/19 0730)  BP: 133/81 (12/12/19 0850)  SpO2: 98 % (12/12/19 0850) Vital Signs (24h Range):  Temp:  [98.2 °F (36.8 °C)-100.1 °F (37.8 °C)] 99.3 °F (37.4 °C)  Pulse:  [] 98  Resp:  [16-46] 27  SpO2:  [83 %-100 %] 98 %  BP: ()/() 133/81     Weight: 65.5 kg (144 lb 6.4 oz)  Body mass index is 21.96 kg/m².    SpO2: 98 %  O2 Device (Oxygen Therapy): nasal cannula      Intake/Output Summary (Last 24 hours) at 12/12/2019 0906  Last data filed at 12/12/2019 0705  Gross per 24 hour   Intake 75.33 ml   Output 2650 ml   Net -2574.67 ml       Lines/Drains/Airways     Airway                 Airway - Non-Surgical 12/12/19 0830  less than 1 day          Peripheral Intravenous Line                 Peripheral IV - Single Lumen 12/11/19 1002 20 G Left Antecubital less than 1 day         Peripheral IV - Single Lumen 12/11/19 1233 3/4 in;20 G Left Hand less than 1 day                Physical Exam   Constitutional: He is oriented to person, place, and time. He appears well-developed and well-nourished. He does not appear ill. No distress.   Eyes: Right conjunctiva is not injected. Right conjunctiva has no hemorrhage. Left conjunctiva is not injected. Left conjunctiva has no hemorrhage. Right pupil is round. Left pupil is round.   Neck: Neck supple. No JVD present.   Cardiovascular: Normal rate, regular rhythm, S1 normal and S2 normal. Exam reveals gallop, S3 and S4.   Murmur heard.  Pulmonary/Chest: Effort normal and breath sounds normal.   Abdominal: Soft. Normal appearance. He exhibits no distension. There is no tenderness.   Musculoskeletal: He exhibits no edema.        Right ankle: He exhibits no swelling.        Left ankle: He exhibits no swelling.   Neurological: He is alert and oriented to person, place, and time. He is  not disoriented.   Skin: Skin is warm and dry. No rash noted.   Psychiatric: He has a normal mood and affect. His speech is normal and behavior is normal. Judgment and thought content normal. Cognition and memory are normal.       Current Medications:     enoxaparin  1 mg/kg Subcutaneous Q12H    guaiFENesin  600 mg Oral BID    levoFLOXacin  750 mg Intravenous Q24H    metoprolol tartrate  25 mg Oral BID    potassium chloride  20 mEq Oral BID     Current Laboratory Results:    Recent Results (from the past 24 hour(s))   CBC auto differential    Collection Time: 12/11/19 10:04 AM   Result Value Ref Range    WBC 13.53 (H) 3.90 - 12.70 K/uL    RBC 5.52 4.60 - 6.20 M/uL    Hemoglobin 15.2 14.0 - 18.0 g/dL    Hematocrit 49.1 40.0 - 54.0 %    Mean Corpuscular Volume 89 82 - 98 fL    Mean Corpuscular Hemoglobin 27.5 27.0 - 31.0 pg    Mean Corpuscular Hemoglobin Conc 31.0 (L) 32.0 - 36.0 g/dL    RDW 15.8 (H) 11.5 - 14.5 %    Platelets 230 150 - 350 K/uL    MPV 12.7 9.2 - 12.9 fL    Immature Granulocytes 0.4 0.0 - 0.5 %    Gran # (ANC) 11.6 (H) 1.8 - 7.7 K/uL    Immature Grans (Abs) 0.06 (H) 0.00 - 0.04 K/uL    Lymph # 1.1 1.0 - 4.8 K/uL    Mono # 0.7 0.3 - 1.0 K/uL    Eos # 0.0 0.0 - 0.5 K/uL    Baso # 0.05 0.00 - 0.20 K/uL    nRBC 0 0 /100 WBC    Gran% 85.9 (H) 38.0 - 73.0 %    Lymph% 8.2 (L) 18.0 - 48.0 %    Mono% 4.9 4.0 - 15.0 %    Eosinophil% 0.2 0.0 - 8.0 %    Basophil% 0.4 0.0 - 1.9 %    Differential Method Automated    Comprehensive metabolic panel    Collection Time: 12/11/19 10:04 AM   Result Value Ref Range    Sodium 141 136 - 145 mmol/L    Potassium 4.0 3.5 - 5.1 mmol/L    Chloride 106 95 - 110 mmol/L    CO2 25 23 - 29 mmol/L    Glucose 112 (H) 70 - 110 mg/dL    BUN, Bld 14 6 - 20 mg/dL    Creatinine 1.3 0.5 - 1.4 mg/dL    Calcium 9.4 8.7 - 10.5 mg/dL    Total Protein 7.2 6.0 - 8.4 g/dL    Albumin 3.5 3.5 - 5.2 g/dL    Total Bilirubin 0.9 0.1 - 1.0 mg/dL    Alkaline Phosphatase 84 55 - 135 U/L    AST 35 10 -  40 U/L    ALT 44 10 - 44 U/L    Anion Gap 10 8 - 16 mmol/L    eGFR if African American >60 >60 mL/min/1.73 m^2    eGFR if non African American >60 >60 mL/min/1.73 m^2   Troponin I #1    Collection Time: 12/11/19 10:04 AM   Result Value Ref Range    Troponin I 0.060 (H) 0.000 - 0.026 ng/mL   B-Type natriuretic peptide (BNP)    Collection Time: 12/11/19 10:04 AM   Result Value Ref Range     (H) 0 - 99 pg/mL   TSH    Collection Time: 12/11/19 10:04 AM   Result Value Ref Range    TSH 1.720 0.400 - 4.000 uIU/mL   Troponin I #2    Collection Time: 12/11/19 11:15 AM   Result Value Ref Range    Troponin I 0.037 (H) 0.000 - 0.026 ng/mL   Lactic acid, plasma    Collection Time: 12/11/19 11:15 AM   Result Value Ref Range    Lactate (Lactic Acid) 1.5 0.5 - 2.2 mmol/L   Blood culture x two cultures. Draw prior to antibiotics.    Collection Time: 12/11/19 11:15 AM   Result Value Ref Range    Blood Culture, Routine No Growth to date    Magnesium    Collection Time: 12/11/19 11:15 AM   Result Value Ref Range    Magnesium 1.8 1.6 - 2.6 mg/dL   Blood culture x two cultures. Draw prior to antibiotics.    Collection Time: 12/11/19 11:21 AM   Result Value Ref Range    Blood Culture, Routine No Growth to date    Troponin I    Collection Time: 12/11/19  1:10 PM   Result Value Ref Range    Troponin I 0.039 (H) 0.000 - 0.026 ng/mL   Procalcitonin    Collection Time: 12/11/19  2:23 PM   Result Value Ref Range    Procalcitonin 0.19 <0.25 ng/mL   Troponin I    Collection Time: 12/11/19  3:52 PM   Result Value Ref Range    Troponin I 0.054 (H) 0.000 - 0.026 ng/mL   PT/INR    Collection Time: 12/11/19  3:52 PM   Result Value Ref Range    Prothrombin Time 11.4 9.0 - 12.5 sec    INR 1.0 0.8 - 1.2   D dimer, quantitative    Collection Time: 12/11/19  3:52 PM   Result Value Ref Range    D-Dimer 1.08 (H) <0.50 mg/L FEU   Urinalysis, Reflex to Urine Culture Urine, Clean Catch    Collection Time: 12/11/19  8:18 PM   Result Value Ref Range     Specimen UA Urine, Clean Catch     Color, UA Yellow Yellow, Straw, Nereyda    Appearance, UA Clear Clear    pH, UA 7.0 5.0 - 8.0    Specific Gravity, UA 1.010 1.005 - 1.030    Protein, UA Negative Negative    Glucose, UA Negative Negative    Ketones, UA Negative Negative    Bilirubin (UA) Negative Negative    Occult Blood UA Negative Negative    Nitrite, UA Negative Negative    Urobilinogen, UA Negative <2.0 EU/dL    Leukocytes, UA Negative Negative   Toxicology screen, urine    Collection Time: 12/11/19  8:18 PM   Result Value Ref Range    Alcohol, Urine <10 <10 mg/dL    Benzodiazepines Negative     Methadone metabolites Negative     Cocaine (Metab.) Negative     Opiate Scrn, Ur Negative     Barbiturate Screen, Ur Negative     Amphetamine Screen, Ur Negative     THC Negative     Phencyclidine Negative     Creatinine, Random Ur 5.4 (L) 23.0 - 375.0 mg/dL    Toxicology Information SEE COMMENT    Troponin I    Collection Time: 12/11/19 10:14 PM   Result Value Ref Range    Troponin I 0.049 (H) 0.000 - 0.026 ng/mL   Basic Metabolic Panel (BMP)    Collection Time: 12/12/19  4:16 AM   Result Value Ref Range    Sodium 141 136 - 145 mmol/L    Potassium 3.9 3.5 - 5.1 mmol/L    Chloride 108 95 - 110 mmol/L    CO2 23 23 - 29 mmol/L    Glucose 97 70 - 110 mg/dL    BUN, Bld 10 6 - 20 mg/dL    Creatinine 1.1 0.5 - 1.4 mg/dL    Calcium 8.9 8.7 - 10.5 mg/dL    Anion Gap 10 8 - 16 mmol/L    eGFR if African American >60 >60 mL/min/1.73 m^2    eGFR if non African American >60 >60 mL/min/1.73 m^2   Magnesium    Collection Time: 12/12/19  4:16 AM   Result Value Ref Range    Magnesium 1.9 1.6 - 2.6 mg/dL   CBC with Automated Differential    Collection Time: 12/12/19  4:16 AM   Result Value Ref Range    WBC 9.44 3.90 - 12.70 K/uL    RBC 5.14 4.60 - 6.20 M/uL    Hemoglobin 14.0 14.0 - 18.0 g/dL    Hematocrit 44.7 40.0 - 54.0 %    Mean Corpuscular Volume 87 82 - 98 fL    Mean Corpuscular Hemoglobin 27.2 27.0 - 31.0 pg    Mean Corpuscular  Hemoglobin Conc 31.3 (L) 32.0 - 36.0 g/dL    RDW 15.8 (H) 11.5 - 14.5 %    Platelets 186 150 - 350 K/uL    MPV 11.7 9.2 - 12.9 fL    Immature Granulocytes 0.4 0.0 - 0.5 %    Gran # (ANC) 8.1 (H) 1.8 - 7.7 K/uL    Immature Grans (Abs) 0.04 0.00 - 0.04 K/uL    Lymph # 0.7 (L) 1.0 - 4.8 K/uL    Mono # 0.6 0.3 - 1.0 K/uL    Eos # 0.0 0.0 - 0.5 K/uL    Baso # 0.05 0.00 - 0.20 K/uL    nRBC 0 0 /100 WBC    Gran% 85.5 (H) 38.0 - 73.0 %    Lymph% 7.5 (L) 18.0 - 48.0 %    Mono% 6.0 4.0 - 15.0 %    Eosinophil% 0.1 0.0 - 8.0 %    Basophil% 0.5 0.0 - 1.9 %    Differential Method Automated    Comprehensive metabolic panel    Collection Time: 12/12/19  4:16 AM   Result Value Ref Range    Sodium 141 136 - 145 mmol/L    Potassium 3.9 3.5 - 5.1 mmol/L    Chloride 108 95 - 110 mmol/L    CO2 23 23 - 29 mmol/L    Glucose 97 70 - 110 mg/dL    BUN, Bld 10 6 - 20 mg/dL    Creatinine 1.1 0.5 - 1.4 mg/dL    Calcium 8.9 8.7 - 10.5 mg/dL    Total Protein 6.4 6.0 - 8.4 g/dL    Albumin 3.1 (L) 3.5 - 5.2 g/dL    Total Bilirubin 1.0 0.1 - 1.0 mg/dL    Alkaline Phosphatase 74 55 - 135 U/L    AST 29 10 - 40 U/L    ALT 44 10 - 44 U/L    Anion Gap 10 8 - 16 mmol/L    eGFR if African American >60 >60 mL/min/1.73 m^2    eGFR if non African American >60 >60 mL/min/1.73 m^2     Current Imaging Results:    X-Ray Chest AP Portable   Final Result      Bilateral, right greater than left, pulmonary infiltrates as detailed.  Clinical correlation.         Electronically signed by: Stephane Francisco   Date:    12/11/2019   Time:    10:23          Assessment and Plan:     Problem List:    Active Diagnoses:    Diagnosis Date Noted POA    PRINCIPAL PROBLEM:  Atrial fibrillation with RVR [I48.91] 12/11/2019 Yes    Community acquired pneumonia of right lung [J18.9] 12/11/2019 Yes    Hypotension [I95.9] 12/11/2019 Yes    Elevated brain natriuretic peptide (BNP) level [R79.89] 12/11/2019 Yes    Elevated troponin [R79.89] 12/11/2019 Yes    Essential hypertension [I10]  12/11/2019 Yes      Problems Resolved During this Admission:     Assessment and Plan:     1. Atrial Fibrillation              12/8/2019: Suspect onset of atrial fibrillation.              12/11/2019: Diagnosed with persistent atrial fibrillation.              [id number] ([facility]).              On diltiazem iv.              Anticoagulation with enoxaparin 1 mg/kg sc Q12.              Metoprolol 25 mg Q6.              Echo today.              Change metoprolol to 50 mg Q12.   Change enoxaparin to apixiban.   Ask SW to see to assess if possible to be able to obtain apixiban as outpatient.     2. Heart Failure              Suspect Systolic and Diastolic, Acute due to hypertension and AF.              Will diurese further.   Add enalapril 2.5 mg Q12.   12/13/2019: Plan follow up CXR. Infiltrates may resolve.     3. Hypertension              2015: Diagnosed.              Treated for a year before he ran out of medications.   On metoprolol 50 mg Q12 and enalapril 2.5 mg Q12.      4. Pneumonia              On antibiotics iv.      5. Primary Care              Not established.    VTE Risk Mitigation (From admission, onward)         Ordered     enoxaparin injection 70 mg  Every 12 hours      12/11/19 1841     IP VTE LOW RISK PATIENT  Once      12/11/19 1511     Place CINTIA hose  Until discontinued      12/11/19 1511                Lara Smith MD  Cardiology  [facility] Cath Lab [facility] 1

## 2019-12-13 PROBLEM — I50.21 ACUTE SYSTOLIC CONGESTIVE HEART FAILURE: Status: ACTIVE | Noted: 2019-12-11

## 2019-12-13 LAB
ANION GAP SERPL CALC-SCNC: 11 MMOL/L (ref 8–16)
BASOPHILS # BLD AUTO: 0.07 K/UL (ref 0–0.2)
BASOPHILS NFR BLD: 0.8 % (ref 0–1.9)
BNP SERPL-MCNC: 1198 PG/ML (ref 0–99)
BUN SERPL-MCNC: 13 MG/DL (ref 6–20)
CALCIUM SERPL-MCNC: 9.3 MG/DL (ref 8.7–10.5)
CHLORIDE SERPL-SCNC: 105 MMOL/L (ref 95–110)
CO2 SERPL-SCNC: 22 MMOL/L (ref 23–29)
CREAT SERPL-MCNC: 1.3 MG/DL (ref 0.5–1.4)
DIFFERENTIAL METHOD: ABNORMAL
EOSINOPHIL # BLD AUTO: 0 K/UL (ref 0–0.5)
EOSINOPHIL NFR BLD: 0.2 % (ref 0–8)
ERYTHROCYTE [DISTWIDTH] IN BLOOD BY AUTOMATED COUNT: 15.7 % (ref 11.5–14.5)
EST. GFR  (AFRICAN AMERICAN): >60 ML/MIN/1.73 M^2
EST. GFR  (NON AFRICAN AMERICAN): >60 ML/MIN/1.73 M^2
GLUCOSE SERPL-MCNC: 64 MG/DL (ref 70–110)
HCT VFR BLD AUTO: 47.9 % (ref 40–54)
HGB BLD-MCNC: 14.6 G/DL (ref 14–18)
IMM GRANULOCYTES # BLD AUTO: 0.04 K/UL (ref 0–0.04)
IMM GRANULOCYTES NFR BLD AUTO: 0.5 % (ref 0–0.5)
LYMPHOCYTES # BLD AUTO: 1 K/UL (ref 1–4.8)
LYMPHOCYTES NFR BLD: 11.9 % (ref 18–48)
MAGNESIUM SERPL-MCNC: 1.9 MG/DL (ref 1.6–2.6)
MCH RBC QN AUTO: 27 PG (ref 27–31)
MCHC RBC AUTO-ENTMCNC: 30.5 G/DL (ref 32–36)
MCV RBC AUTO: 89 FL (ref 82–98)
MONOCYTES # BLD AUTO: 0.7 K/UL (ref 0.3–1)
MONOCYTES NFR BLD: 8.4 % (ref 4–15)
NEUTROPHILS # BLD AUTO: 6.5 K/UL (ref 1.8–7.7)
NEUTROPHILS NFR BLD: 78.2 % (ref 38–73)
NRBC BLD-RTO: 0 /100 WBC
PLATELET # BLD AUTO: 211 K/UL (ref 150–350)
PMV BLD AUTO: 12.7 FL (ref 9.2–12.9)
POTASSIUM SERPL-SCNC: 4.4 MMOL/L (ref 3.5–5.1)
RBC # BLD AUTO: 5.41 M/UL (ref 4.6–6.2)
SODIUM SERPL-SCNC: 138 MMOL/L (ref 136–145)
WBC # BLD AUTO: 8.3 K/UL (ref 3.9–12.7)

## 2019-12-13 PROCEDURE — 11000001 HC ACUTE MED/SURG PRIVATE ROOM

## 2019-12-13 PROCEDURE — 63600175 PHARM REV CODE 636 W HCPCS: Performed by: HOSPITALIST

## 2019-12-13 PROCEDURE — 99233 PR SUBSEQUENT HOSPITAL CARE,LEVL III: ICD-10-PCS | Mod: ,,, | Performed by: HOSPITALIST

## 2019-12-13 PROCEDURE — 85025 COMPLETE CBC W/AUTO DIFF WBC: CPT

## 2019-12-13 PROCEDURE — 25000003 PHARM REV CODE 250: Performed by: INTERNAL MEDICINE

## 2019-12-13 PROCEDURE — 83735 ASSAY OF MAGNESIUM: CPT

## 2019-12-13 PROCEDURE — 83880 ASSAY OF NATRIURETIC PEPTIDE: CPT

## 2019-12-13 PROCEDURE — 99233 SBSQ HOSP IP/OBS HIGH 50: CPT | Mod: ,,, | Performed by: HOSPITALIST

## 2019-12-13 PROCEDURE — 25000003 PHARM REV CODE 250: Performed by: HOSPITALIST

## 2019-12-13 PROCEDURE — 36415 COLL VENOUS BLD VENIPUNCTURE: CPT

## 2019-12-13 PROCEDURE — 80048 BASIC METABOLIC PNL TOTAL CA: CPT

## 2019-12-13 RX ORDER — FUROSEMIDE 40 MG/1
40 TABLET ORAL DAILY
Status: DISCONTINUED | OUTPATIENT
Start: 2019-12-13 | End: 2019-12-14 | Stop reason: HOSPADM

## 2019-12-13 RX ORDER — ENALAPRIL MALEATE 2.5 MG/1
5 TABLET ORAL 2 TIMES DAILY
Status: DISCONTINUED | OUTPATIENT
Start: 2019-12-13 | End: 2019-12-14

## 2019-12-13 RX ADMIN — METOPROLOL TARTRATE 50 MG: 50 TABLET ORAL at 08:12

## 2019-12-13 RX ADMIN — ENALAPRIL MALEATE 5 MG: 2.5 TABLET ORAL at 08:12

## 2019-12-13 RX ADMIN — FUROSEMIDE 40 MG: 40 TABLET ORAL at 11:12

## 2019-12-13 RX ADMIN — LEVOFLOXACIN 750 MG: 750 INJECTION, SOLUTION INTRAVENOUS at 04:12

## 2019-12-13 RX ADMIN — APIXABAN 5 MG: 2.5 TABLET, FILM COATED ORAL at 08:12

## 2019-12-13 RX ADMIN — GUAIFENESIN 600 MG: 600 TABLET, EXTENDED RELEASE ORAL at 08:12

## 2019-12-13 NOTE — PROGRESS NOTES
"[facility] Hospital Medicine  Progress Note    Patient Name: Ilya Thomas  MRN: [id number]  Patient Class: IP- Inpatient   Admission Date: 12/11/2019  Length of Stay: 2 days  Attending Physician: Nicholas Vargas MD  Primary Care Provider: Primary Doctor No        Subjective:     Principal Problem:Atrial fibrillation with RVR        HPI:  Mr. Thomas is a 49 year old man with history of hypertension and medication non-compliance who came in for evaluation of racing heart and codl symptoms for three days.  He states he has had a dry cough for the last three days and took cough syrup with minimal relief.  He denies fever, chills, headache, myalgias, body aches, chest pain and fever.  His mother compliments the history and ntoes that last night he seemed to be coughing more and was working a bit harder to breath.  They went to urgent care this morning and he was noted to have a pulse of 167.  EKG was obtained which showed atrial fibrillation with rvr and he was referred to our ER.  He notes that he could "adams feel" that his heart rate was going fast.  His mother interjects that he was very weak and light headed this morning.  In the ER he was initially hypertensive and found to have afib with rvr.  He was given lopressor with minimal results and then given diltiazem and his hr quickly improved to the 90s.  Subsequently his blood pressure dropped into the upper 80s, but he continued without symptoms of light headedness or shortness of breath even when raising himself up for exam.  He is admitted to the ICU with low suspicious of sepsis for close monitoring, cardizem drip and IV antibiotics for pneumonia.    Overview/Hospital Course:  No notes on file    Interval History: No acute events overnight.  Continues in NSR.  Feels much better.  Still a bit of a dry cough.    Review of Systems   Constitutional: Negative for activity change, appetite change and fever.   HENT: Negative for congestion and " dental problem.    Eyes: Negative for discharge and itching.   Respiratory: Positive for cough. Negative for apnea, chest tightness and shortness of breath.    Cardiovascular: Negative for chest pain and leg swelling.   Gastrointestinal: Negative for abdominal distention and abdominal pain.   Endocrine: Negative for cold intolerance and heat intolerance.   Genitourinary: Negative for difficulty urinating and dysuria.   Musculoskeletal: Negative for arthralgias and back pain.   Allergic/Immunologic: Negative for environmental allergies and food allergies.   Neurological: Negative for dizziness, facial asymmetry, weakness and light-headedness.   Hematological: Negative for adenopathy. Does not bruise/bleed easily.   Psychiatric/Behavioral: Negative for agitation and behavioral problems.     Objective:     Vital Signs (Most Recent):  Temp: 98.1 °F (36.7 °C) (12/13/19 0710)  Pulse: 85 (12/13/19 1000)  Resp: 18 (12/13/19 0710)  BP: 120/81 (12/13/19 0710)  SpO2: 95 % (12/13/19 0710) Vital Signs (24h Range):  Temp:  [98.1 °F (36.7 °C)-99.8 °F (37.7 °C)] 98.1 °F (36.7 °C)  Pulse:  [] 85  Resp:  [16-28] 18  SpO2:  [93 %-97 %] 95 %  BP: (107-127)/(74-89) 120/81     Weight: 62.5 kg (137 lb 12.6 oz)  Body mass index is 20.95 kg/m².    Intake/Output Summary (Last 24 hours) at 12/13/2019 1136  Last data filed at 12/13/2019 0600  Gross per 24 hour   Intake 720 ml   Output --   Net 720 ml      Physical Exam   Constitutional: He is oriented to person, place, and time. He appears well-developed.   Thin man with a non-toxic appearance   HENT:   Head: Normocephalic and atraumatic.   Eyes: Pupils are equal, round, and reactive to light. EOM are normal.   Neck: Normal range of motion. Neck supple.   Cardiovascular: Normal rate, regular rhythm and normal heart sounds.   Pulmonary/Chest: Effort normal and breath sounds normal. No respiratory distress.   Bibasilar crackles, R>L.   Abdominal: Soft. Bowel sounds are normal. He exhibits  no distension. There is no tenderness.   Musculoskeletal: Normal range of motion. He exhibits no edema.   Neurological: He is oriented to person, place, and time. No cranial nerve deficit. Coordination normal.   Skin: Skin is warm and dry.   Psychiatric: He has a normal mood and affect. His behavior is normal.   Vitals reviewed.      Significant Labs: All pertinent labs within the past 24 hours have been reviewed.    Significant Imaging: I have reviewed and interpreted all pertinent imaging results/findings within the past 24 hours.      Assessment/Plan:      * Atrial fibrillation with RVR  -Mr. Thomas was admitted to inpatient status in our ICU  -No prior cardiac history noted.  TSH is normal.  Troponin minimally elevated.  No chest pain.  Has history of untreated htn.  -Suspect secondary to coughing and respiratory illness vs hypertensive cardiomyopathy.  -Did not respond to well to lopressor in ER.  HR transiently improved after receiving diltiazem 10 but his blood pressure did drop  -Troponins minimally elevated and down trending most likely due to demand ischemia.  -Treated with cardizem drip on 1st night and converted to NSR early on 12/12.  He was transferred to the floor on 12/12.  -[id number] score of 2 for hypertension and chf.  -Patient has been started on metoprolol and apixaban by Dr. Smith and I discussed the case with him today.      Community acquired pneumonia of right lung  -On admit he was euthermic with normal lactic acid but leukocytosis and evidence of bilateral pneumonia vs pulmonary edema   -In ER blood cultures were obtained and he received rocephin and azithromycin.  Was noted to be hypotensive in ER likely due to lopressor and diltiazem and not sepsis.  -Procalcitonin is normal.  -Cultures negative so far.  -Has been treated with IV levaquin.  -I suspect his pulmonary infiltrates were most likely pulmonary edema and not pneumonia.  CXR improving and afebrile with normal WBC.     -Will stop antibiotics today.    Hypotension  -This was due to lopressor and diltiazem in ER and not sepsis  -AM cortisol normal  Troponins minimally elevated and down-trending.  -Echo reviewed  -BP is well controlled and normal today.    Acute systolic congestive heart failure  - on admit  -Suspected this was due to transient diminished cardiac output from afib/rvr but has been found to have systolic chf  -Echo showed EF 35%  -Continue strict ins/outs and daily weights.  -Has been started on metoprolol and enalipril  -Today cxr improved but still with bibasilar crackles and BNP more elevated.  Will add lasix 40mg daily.  -Dr. Smith on board and input appreciated.      Elevated troponin  -Likely secondary to demand ischemia from afib/rvr  -No chest pain and no obvious ischemic changes on EKG at this time.  -Troponins minimally elevated and trended down.  -Treatment as above.    Essential hypertension  -Diagnosed several years ago, but has not been on treatment for at least three years.  -EKG shows likely LVH  -Hypertensive on admit but became hypotensive after receiving lopressor and diltiazem  -Monitored closely in ICU and BP now well controlled on metoprolol and low dose enalipril.        VTE Risk Mitigation (From admission, onward)         Ordered     apixaban tablet 5 mg  2 times daily      12/12/19 0913     IP VTE LOW RISK PATIENT  Once      12/11/19 1511     Place CINTIA hose  Until discontinued      12/11/19 1511                      Nicholas Vargas MD  Department of Hospital Medicine   [facility]

## 2019-12-13 NOTE — ASSESSMENT & PLAN NOTE
-Mr. Thomas was admitted to inpatient status in our ICU  -No prior cardiac history noted.  TSH is normal.  Troponin minimally elevated.  No chest pain.  Has history of untreated htn.  -Suspect secondary to coughing and respiratory illness vs hypertensive cardiomyopathy.  -Did not respond to well to lopressor in ER.  HR transiently improved after receiving diltiazem 10 but his blood pressure did drop  -Troponins minimally elevated and down trending most likely due to demand ischemia.  -Treated with cardizem drip on 1st night and converted to NSR early on 12/12.  He was transferred to the floor on 12/12.  -[id number] score of 2 for hypertension and chf.  -Patient has been started on metoprolol and apixaban by Dr. Smith and I discussed the case with him today.

## 2019-12-13 NOTE — ASSESSMENT & PLAN NOTE
- on admit  -Suspected this was due to transient diminished cardiac output from afib/rvr but has been found to have systolic chf  -Echo showed EF 35%  -Continue strict ins/outs and daily weights.  -Has been started on metoprolol and enalipril  -Today cxr improved but still with bibasilar crackles and BNP more elevated.  Will add lasix 40mg daily.  -Dr. Smith on board and input appreciated.

## 2019-12-13 NOTE — ASSESSMENT & PLAN NOTE
-This was due to lopressor and diltiazem in ER and not sepsis  -AM cortisol normal  Troponins minimally elevated and down-trending.  -Echo reviewed  -BP is well controlled and normal today.

## 2019-12-13 NOTE — PLAN OF CARE
Patient is alert and oriented with no communication barriers.     Prior to admission patient was independent. Patient denies the use of HH or DME.      Patient does not have a PCP. LMSW will arrange prior to discharge. [facility] [address] LMSW noted MD consult for Apixiban. MD will have to order prescription with frequency and dosage for pharmacy to run. Patient has insurance coverage.     Patient denies having advance directives.      Patients family will transport the patient home at discharge.     CM team will continue to follow.              12/13/19 0901   Discharge Assessment   Assessment Type Discharge Planning Assessment   Confirmed/corrected address and phone number on facesheet? Yes   Assessment information obtained from? Patient;Medical Record   Communicated expected length of stay with patient/caregiver no   Prior to hospitilization cognitive status: Alert/Oriented   Prior to hospitalization functional status: Independent   Current cognitive status: Alert/Oriented   Current Functional Status: Independent   Lives With alone   Able to Return to Prior Arrangements yes   Is patient able to care for self after discharge? Yes   Patient's perception of discharge disposition home or selfcare   Readmission Within the Last 30 Days no previous admission in last 30 days   Patient currently being followed by outpatient case management? No   Patient currently receives any other outside agency services? No   Equipment Currently Used at Home none   Do you have any problems affording any of your prescribed medications? No   Is the patient taking medications as prescribed? yes   Does the patient have transportation home? Yes   Transportation Anticipated family or friend will provide   Does the patient receive services at the [facility]? No   Discharge Plan A Home   Patient/Family in Agreement with Plan yes

## 2019-12-13 NOTE — ASSESSMENT & PLAN NOTE
-Diagnosed several years ago, but has not been on treatment for at least three years.  -EKG shows likely LVH  -Hypertensive on admit but became hypotensive after receiving lopressor and diltiazem  -Monitored closely in ICU and BP now well controlled on metoprolol and low dose enalipril.

## 2019-12-13 NOTE — SUBJECTIVE & OBJECTIVE
Interval History: No acute events overnight.  Continues in NSR.  Feels much better.  Still a bit of a dry cough.    Review of Systems   Constitutional: Negative for activity change, appetite change and fever.   HENT: Negative for congestion and dental problem.    Eyes: Negative for discharge and itching.   Respiratory: Positive for cough. Negative for apnea, chest tightness and shortness of breath.    Cardiovascular: Negative for chest pain and leg swelling.   Gastrointestinal: Negative for abdominal distention and abdominal pain.   Endocrine: Negative for cold intolerance and heat intolerance.   Genitourinary: Negative for difficulty urinating and dysuria.   Musculoskeletal: Negative for arthralgias and back pain.   Allergic/Immunologic: Negative for environmental allergies and food allergies.   Neurological: Negative for dizziness, facial asymmetry, weakness and light-headedness.   Hematological: Negative for adenopathy. Does not bruise/bleed easily.   Psychiatric/Behavioral: Negative for agitation and behavioral problems.     Objective:     Vital Signs (Most Recent):  Temp: 98.1 °F (36.7 °C) (12/13/19 0710)  Pulse: 85 (12/13/19 1000)  Resp: 18 (12/13/19 0710)  BP: 120/81 (12/13/19 0710)  SpO2: 95 % (12/13/19 0710) Vital Signs (24h Range):  Temp:  [98.1 °F (36.7 °C)-99.8 °F (37.7 °C)] 98.1 °F (36.7 °C)  Pulse:  [] 85  Resp:  [16-28] 18  SpO2:  [93 %-97 %] 95 %  BP: (107-127)/(74-89) 120/81     Weight: 62.5 kg (137 lb 12.6 oz)  Body mass index is 20.95 kg/m².    Intake/Output Summary (Last 24 hours) at 12/13/2019 1136  Last data filed at 12/13/2019 0600  Gross per 24 hour   Intake 720 ml   Output --   Net 720 ml      Physical Exam   Constitutional: He is oriented to person, place, and time. He appears well-developed.   Thin man with a non-toxic appearance   HENT:   Head: Normocephalic and atraumatic.   Eyes: Pupils are equal, round, and reactive to light. EOM are normal.   Neck: Normal range of motion. Neck  supple.   Cardiovascular: Normal rate, regular rhythm and normal heart sounds.   Pulmonary/Chest: Effort normal and breath sounds normal. No respiratory distress.   Bibasilar crackles, R>L.   Abdominal: Soft. Bowel sounds are normal. He exhibits no distension. There is no tenderness.   Musculoskeletal: Normal range of motion. He exhibits no edema.   Neurological: He is oriented to person, place, and time. No cranial nerve deficit. Coordination normal.   Skin: Skin is warm and dry.   Psychiatric: He has a normal mood and affect. His behavior is normal.   Vitals reviewed.      Significant Labs: All pertinent labs within the past 24 hours have been reviewed.    Significant Imaging: I have reviewed and interpreted all pertinent imaging results/findings within the past 24 hours.

## 2019-12-13 NOTE — PROGRESS NOTES
[facility]  Cardiology  Progress Note    Patient Name: Ilya Thomas  MRN: [id number]  Admission Date: 12/11/2019  Hospital Length of Stay: 2 days  Code Status: Full Code   Attending Physician: Nicholas Vargas MD   Primary Care Physician: Primary Doctor No  Expected Discharge Date:   Principal Problem:Atrial fibrillation with RVR    Subjective:     Brief HPI:    Ilya Thomas is a 49 y.o.male with hypertension. About 12/8/2019 he noted that he was short ob breath with some coughing. Over the next few days he became increasingly short of breath and the night of 12/10/2019 he had difficulty lying down due to SOB. He was seen in  on 12/11/2019 and noted to be in atrial fibrillation with fast VRR. CXR reveals infiltrates consistent with pneumonia or heart failure. He was admitted. He felt palpitations but is quite uncertain for how long his heart has been racing.       Hospital Course:     12/12/2019: Converted to sinus rhythm.    Interval History:    Breathing well.    Review of Systems   Constitution: Negative for chills, fever and malaise/fatigue.   HENT: Negative for nosebleeds.    Eyes: Negative for vision loss in left eye and vision loss in right eye.   Cardiovascular: Negative for chest pain, leg swelling, orthopnea, palpitations and paroxysmal nocturnal dyspnea.   Respiratory: Negative for cough, hemoptysis, shortness of breath, sputum production and wheezing.    Hematologic/Lymphatic: Negative for bleeding problem.   Skin: Negative for rash.   Musculoskeletal: Negative for myalgias.   Gastrointestinal: Negative for abdominal pain, heartburn, hematemesis, hematochezia, jaundice, melena, nausea and vomiting.   Genitourinary: Negative for hematuria.   Neurological: Negative for dizziness, headaches, light-headedness, vertigo and weakness.   Psychiatric/Behavioral: Negative for altered mental status. The patient is not nervous/anxious.    Allergic/Immunologic: Negative for persistent  infections.     Objective:     Vital Signs (Most Recent):  Temp: 98.1 °F (36.7 °C) (12/13/19 0710)  Pulse: 88 (12/13/19 1600)  Resp: 18 (12/13/19 0710)  BP: 120/81 (12/13/19 0710)  SpO2: 95 % (12/13/19 0710) Vital Signs (24h Range):  Temp:  [98.1 °F (36.7 °C)-99.8 °F (37.7 °C)] 98.1 °F (36.7 °C)  Pulse:  [] 88  Resp:  [16-18] 18  SpO2:  [95 %] 95 %  BP: (120-125)/(79-89) 120/81     Weight: 62.5 kg (137 lb 12.6 oz)  Body mass index is 20.95 kg/m².    SpO2: 95 %  O2 Device (Oxygen Therapy): room air      Intake/Output Summary (Last 24 hours) at 12/13/2019 1636  Last data filed at 12/13/2019 0600  Gross per 24 hour   Intake 480 ml   Output --   Net 480 ml       Lines/Drains/Airways     Airway                 Airway - Non-Surgical 12/12/19 0830  1 day          Peripheral Intravenous Line                 Peripheral IV - Single Lumen 12/11/19 1002 20 G Left Antecubital 2 days         Peripheral IV - Single Lumen 12/11/19 1233 3/4 in;20 G Left Hand 2 days                Physical Exam   Constitutional: He is oriented to person, place, and time. He appears well-developed and well-nourished. He does not appear ill. No distress.   Eyes: Right conjunctiva is not injected. Right conjunctiva has no hemorrhage. Left conjunctiva is not injected. Left conjunctiva has no hemorrhage. Right pupil is round. Left pupil is round.   Neck: Neck supple. No JVD present.   Cardiovascular: Normal rate, regular rhythm, S1 normal and S2 normal. Exam reveals gallop, S3 and S4.   Murmur heard.  Pulmonary/Chest: Effort normal and breath sounds normal.   Abdominal: Soft. Normal appearance. He exhibits no distension. There is no tenderness.   Musculoskeletal: He exhibits no edema.        Right ankle: He exhibits no swelling.        Left ankle: He exhibits no swelling.   Neurological: He is alert and oriented to person, place, and time. He is not disoriented.   Skin: Skin is warm and dry. No rash noted.   Psychiatric: He has a normal mood and  affect. His speech is normal and behavior is normal. Judgment and thought content normal. Cognition and memory are normal.       Current Medications:     apixaban  5 mg Oral BID    enalapril  2.5 mg Oral BID    furosemide  40 mg Oral Daily    guaiFENesin  600 mg Oral BID    metoprolol tartrate  50 mg Oral BID     Current Laboratory Results:    Recent Results (from the past 24 hour(s))   Basic Metabolic Panel (BMP)    Collection Time: 12/13/19  4:43 AM   Result Value Ref Range    Sodium 138 136 - 145 mmol/L    Potassium 4.4 3.5 - 5.1 mmol/L    Chloride 105 95 - 110 mmol/L    CO2 22 (L) 23 - 29 mmol/L    Glucose 64 (L) 70 - 110 mg/dL    BUN, Bld 13 6 - 20 mg/dL    Creatinine 1.3 0.5 - 1.4 mg/dL    Calcium 9.3 8.7 - 10.5 mg/dL    Anion Gap 11 8 - 16 mmol/L    eGFR if African American >60 >60 mL/min/1.73 m^2    eGFR if non African American >60 >60 mL/min/1.73 m^2   Magnesium    Collection Time: 12/13/19  4:43 AM   Result Value Ref Range    Magnesium 1.9 1.6 - 2.6 mg/dL   CBC with Automated Differential    Collection Time: 12/13/19  4:43 AM   Result Value Ref Range    WBC 8.30 3.90 - 12.70 K/uL    RBC 5.41 4.60 - 6.20 M/uL    Hemoglobin 14.6 14.0 - 18.0 g/dL    Hematocrit 47.9 40.0 - 54.0 %    Mean Corpuscular Volume 89 82 - 98 fL    Mean Corpuscular Hemoglobin 27.0 27.0 - 31.0 pg    Mean Corpuscular Hemoglobin Conc 30.5 (L) 32.0 - 36.0 g/dL    RDW 15.7 (H) 11.5 - 14.5 %    Platelets 211 150 - 350 K/uL    MPV 12.7 9.2 - 12.9 fL    Immature Granulocytes 0.5 0.0 - 0.5 %    Gran # (ANC) 6.5 1.8 - 7.7 K/uL    Immature Grans (Abs) 0.04 0.00 - 0.04 K/uL    Lymph # 1.0 1.0 - 4.8 K/uL    Mono # 0.7 0.3 - 1.0 K/uL    Eos # 0.0 0.0 - 0.5 K/uL    Baso # 0.07 0.00 - 0.20 K/uL    nRBC 0 0 /100 WBC    Gran% 78.2 (H) 38.0 - 73.0 %    Lymph% 11.9 (L) 18.0 - 48.0 %    Mono% 8.4 4.0 - 15.0 %    Eosinophil% 0.2 0.0 - 8.0 %    Basophil% 0.8 0.0 - 1.9 %    Differential Method Automated    Brain natriuretic peptide    Collection Time:  12/13/19  4:43 AM   Result Value Ref Range    BNP 1,198 (H) 0 - 99 pg/mL     Current Imaging Results:    X-Ray Chest PA And Lateral   Final Result      Improving CHF         Electronically signed by: Roel Moody MD   Date:    12/13/2019   Time:    09:23      X-Ray Chest AP Portable   Final Result      Bilateral, right greater than left, pulmonary infiltrates as detailed.  Clinical correlation.         Electronically signed by: Stephane Francisco   Date:    12/11/2019   Time:    10:23          12/13/2019: Echo:    Moderately decreased left ventricular systolic function. The estimated ejection fraction is 35%  Moderate global hypokinetic wall motion.  Mild eccentric left ventricular hypertrophy.  Normal LV diastolic function.  Severe left atrial enlargement.  Mildly reduced right ventricular systolic function.  Moderate right atrial enlargement.  The mitral valve shows mild prolapse of the anterior mitral leaflet.  Mild mitral prolapse of the posterior mitral leaflet.  Mild-to-moderate mitral regurgitation.  Mild tricuspid regurgitation.  No pulmonary hypertension present.  The estimated PA systolic pressure is 31 mm Hg  Intermediate central venous pressure (8 mm Hg).      Assessment and Plan:     Problem List:    Active Diagnoses:    Diagnosis Date Noted POA    PRINCIPAL PROBLEM:  Atrial fibrillation with RVR [I48.91] 12/11/2019 Yes    Community acquired pneumonia of right lung [J18.9] 12/11/2019 Yes    Hypotension [I95.9] 12/11/2019 Yes    Acute systolic congestive heart failure [I50.21] 12/11/2019 Yes    Elevated troponin [R79.89] 12/11/2019 Yes    Essential hypertension [I10] 12/11/2019 Yes      Problems Resolved During this Admission:     Assessment and Plan:     1. Atrial Fibrillation              12/8/2019: Suspect onset of atrial fibrillation.              12/11/2019: Diagnosed with persistent atrial fibrillation.              [id number]=0 (CH).              On diltiazem iv.   On apixiban 5 mg Q12.               On metoprolol 50 mg Q12.              SW arranged for her to get apixiban as an outpatient.   No recurrence.     2. Heart Failure, Systolic and Diastolic, Acute          12/13/2019: Echo: Normal left ventricular size with moderate systolic dysfunction. EF 35%. Mild LVH. Severely dilated LA. Mild MVP. Mild to moderate MR.    12/13/2019: CXR. Improving HF.   On enalapril 2.5 mg Q12.              Will diurese further.      3. Hypertension              2015: Diagnosed.              Treated for a year before he ran out of medications.   On metoprolol 50 mg Q12 and enalapril 2.5 mg Q12.      4. Pneumonia              On antibiotics iv.      5. Primary Care              Not established.    VTE Risk Mitigation (From admission, onward)         Ordered     apixaban tablet 5 mg  2 times daily      12/12/19 0913     IP VTE LOW RISK PATIENT  Once      12/11/19 1511     Place CINTIA hose  Until discontinued      12/11/19 1511                Lara Smith MD  Cardiology  [facility] [place] [facility]

## 2019-12-13 NOTE — ASSESSMENT & PLAN NOTE
-On admit he was euthermic with normal lactic acid but leukocytosis and evidence of bilateral pneumonia vs pulmonary edema   -In ER blood cultures were obtained and he received rocephin and azithromycin.  Was noted to be hypotensive in ER likely due to lopressor and diltiazem and not sepsis.  -Procalcitonin is normal.  -Cultures negative so far.  -Has been treated with IV levaquin.  -I suspect his pulmonary infiltrates were most likely pulmonary edema and not pneumonia.  CXR improving and afebrile with normal WBC.    -Will stop antibiotics today.

## 2019-12-13 NOTE — PLAN OF CARE
VSS and afebrile, AAOx4. No pain or complaints this shift. Ordered diet tolerated well. Tele maintained running mid 80s all shift. Plan of care reviewed with patient. Purposeful rounding done, call light at bed side, bed at lowest position, brakes on, non-skid socks on, will continue to monitor.

## 2019-12-14 VITALS
DIASTOLIC BLOOD PRESSURE: 77 MMHG | RESPIRATION RATE: 16 BRPM | BODY MASS INDEX: 20.78 KG/M2 | SYSTOLIC BLOOD PRESSURE: 110 MMHG | HEART RATE: 86 BPM | OXYGEN SATURATION: 93 % | WEIGHT: 137.13 LBS | HEIGHT: 68 IN | TEMPERATURE: 99 F

## 2019-12-14 LAB
ANION GAP SERPL CALC-SCNC: 11 MMOL/L (ref 8–16)
BASOPHILS # BLD AUTO: 0.1 K/UL (ref 0–0.2)
BASOPHILS NFR BLD: 1.5 % (ref 0–1.9)
BUN SERPL-MCNC: 16 MG/DL (ref 6–20)
CALCIUM SERPL-MCNC: 9.7 MG/DL (ref 8.7–10.5)
CHLORIDE SERPL-SCNC: 103 MMOL/L (ref 95–110)
CO2 SERPL-SCNC: 23 MMOL/L (ref 23–29)
CREAT SERPL-MCNC: 1.4 MG/DL (ref 0.5–1.4)
DIFFERENTIAL METHOD: ABNORMAL
EOSINOPHIL # BLD AUTO: 0.1 K/UL (ref 0–0.5)
EOSINOPHIL NFR BLD: 1.5 % (ref 0–8)
ERYTHROCYTE [DISTWIDTH] IN BLOOD BY AUTOMATED COUNT: 16.3 % (ref 11.5–14.5)
EST. GFR  (AFRICAN AMERICAN): >60 ML/MIN/1.73 M^2
EST. GFR  (NON AFRICAN AMERICAN): 58 ML/MIN/1.73 M^2
GLUCOSE SERPL-MCNC: 81 MG/DL (ref 70–110)
HCT VFR BLD AUTO: 53.9 % (ref 40–54)
HGB BLD-MCNC: 16.9 G/DL (ref 14–18)
IMM GRANULOCYTES # BLD AUTO: 0.03 K/UL (ref 0–0.04)
IMM GRANULOCYTES NFR BLD AUTO: 0.4 % (ref 0–0.5)
LYMPHOCYTES # BLD AUTO: 1.1 K/UL (ref 1–4.8)
LYMPHOCYTES NFR BLD: 15.9 % (ref 18–48)
MAGNESIUM SERPL-MCNC: 2 MG/DL (ref 1.6–2.6)
MCH RBC QN AUTO: 27.3 PG (ref 27–31)
MCHC RBC AUTO-ENTMCNC: 31.4 G/DL (ref 32–36)
MCV RBC AUTO: 87 FL (ref 82–98)
MONOCYTES # BLD AUTO: 0.5 K/UL (ref 0.3–1)
MONOCYTES NFR BLD: 7.4 % (ref 4–15)
NEUTROPHILS # BLD AUTO: 5 K/UL (ref 1.8–7.7)
NEUTROPHILS NFR BLD: 73.3 % (ref 38–73)
NRBC BLD-RTO: 0 /100 WBC
PLATELET # BLD AUTO: 219 K/UL (ref 150–350)
PMV BLD AUTO: 11.2 FL (ref 9.2–12.9)
POTASSIUM SERPL-SCNC: 4.2 MMOL/L (ref 3.5–5.1)
RBC # BLD AUTO: 6.18 M/UL (ref 4.6–6.2)
SODIUM SERPL-SCNC: 137 MMOL/L (ref 136–145)
WBC # BLD AUTO: 6.86 K/UL (ref 3.9–12.7)

## 2019-12-14 PROCEDURE — 99239 HOSP IP/OBS DSCHRG MGMT >30: CPT | Mod: ,,, | Performed by: HOSPITALIST

## 2019-12-14 PROCEDURE — 85025 COMPLETE CBC W/AUTO DIFF WBC: CPT

## 2019-12-14 PROCEDURE — 80048 BASIC METABOLIC PNL TOTAL CA: CPT

## 2019-12-14 PROCEDURE — 83735 ASSAY OF MAGNESIUM: CPT

## 2019-12-14 PROCEDURE — 94761 N-INVAS EAR/PLS OXIMETRY MLT: CPT

## 2019-12-14 PROCEDURE — 25000003 PHARM REV CODE 250: Performed by: HOSPITALIST

## 2019-12-14 PROCEDURE — 99239 PR HOSPITAL DISCHARGE DAY,>30 MIN: ICD-10-PCS | Mod: ,,, | Performed by: HOSPITALIST

## 2019-12-14 PROCEDURE — 36415 COLL VENOUS BLD VENIPUNCTURE: CPT

## 2019-12-14 PROCEDURE — 25000003 PHARM REV CODE 250: Performed by: INTERNAL MEDICINE

## 2019-12-14 RX ORDER — ENALAPRIL MALEATE 2.5 MG/1
10 TABLET ORAL 2 TIMES DAILY
Status: DISCONTINUED | OUTPATIENT
Start: 2019-12-14 | End: 2019-12-14 | Stop reason: HOSPADM

## 2019-12-14 RX ORDER — ENALAPRIL MALEATE 5 MG/1
5 TABLET ORAL 2 TIMES DAILY
Qty: 60 TABLET | Refills: 1 | Status: SHIPPED | OUTPATIENT
Start: 2019-12-14 | End: 2019-12-14

## 2019-12-14 RX ORDER — ENALAPRIL MALEATE 10 MG/1
10 TABLET ORAL 2 TIMES DAILY
Qty: 60 TABLET | Refills: 1 | Status: SHIPPED | OUTPATIENT
Start: 2019-12-14 | End: 2019-12-14 | Stop reason: HOSPADM

## 2019-12-14 RX ORDER — ENALAPRIL MALEATE 10 MG/1
10 TABLET ORAL 2 TIMES DAILY
Qty: 60 TABLET | Refills: 1 | Status: SHIPPED | OUTPATIENT
Start: 2019-12-14 | End: 2019-12-24 | Stop reason: SDUPTHER

## 2019-12-14 RX ORDER — METOPROLOL TARTRATE 50 MG/1
50 TABLET ORAL 2 TIMES DAILY
Qty: 60 TABLET | Refills: 1 | Status: SHIPPED | OUTPATIENT
Start: 2019-12-14 | End: 2019-12-24 | Stop reason: SDUPTHER

## 2019-12-14 RX ORDER — FUROSEMIDE 40 MG/1
40 TABLET ORAL DAILY
Qty: 30 TABLET | Refills: 1 | Status: SHIPPED | OUTPATIENT
Start: 2019-12-15 | End: 2019-12-24

## 2019-12-14 RX ORDER — BENZONATATE 100 MG/1
100 CAPSULE ORAL 3 TIMES DAILY PRN
Qty: 30 CAPSULE | Refills: 0 | Status: SHIPPED | OUTPATIENT
Start: 2019-12-14 | End: 2019-12-24

## 2019-12-14 RX ADMIN — GUAIFENESIN 600 MG: 600 TABLET, EXTENDED RELEASE ORAL at 09:12

## 2019-12-14 RX ADMIN — ENALAPRIL MALEATE 5 MG: 2.5 TABLET ORAL at 09:12

## 2019-12-14 RX ADMIN — APIXABAN 5 MG: 2.5 TABLET, FILM COATED ORAL at 09:12

## 2019-12-14 RX ADMIN — FUROSEMIDE 40 MG: 40 TABLET ORAL at 09:12

## 2019-12-14 RX ADMIN — METOPROLOL TARTRATE 50 MG: 50 TABLET ORAL at 09:12

## 2019-12-14 NOTE — ASSESSMENT & PLAN NOTE
-Mr. Thomas was admitted to inpatient status in our ICU  -No prior cardiac history noted.  TSH is normal.  Troponin minimally elevated.  No chest pain.  Has history of untreated htn.  -Suspect secondary to coughing and respiratory illness vs hypertensive cardiomyopathy.  -Did not respond to well to lopressor in ER.  HR transiently improved after receiving diltiazem 10 but his blood pressure did drop  -Troponins minimally elevated and down trending most likely due to demand ischemia.  -Treated with cardizem drip on 1st night and converted to NSR early on 12/12.  He was transferred to the floor on 12/12.  -[id number] score of 2 for hypertension and chf.  -Patient has been started on metoprolol and apixaban by Dr. Smith and I discussed the case with him today.

## 2019-12-14 NOTE — ASSESSMENT & PLAN NOTE
-On admit he was euthermic with normal lactic acid but leukocytosis and evidence of bilateral pneumonia vs pulmonary edema   -In ER blood cultures were obtained and he received rocephin and azithromycin.  Was noted to be hypotensive in ER likely due to lopressor and diltiazem and not sepsis.  -Procalcitonin is normal.  Cultures negative so far.  -Has was treated with IV levaquin.  -I suspect his pulmonary infiltrates were most likely pulmonary edema and NOT PNEUMONIA.  CXR improving and afebrile with normal WBC.    -Antibiotics discontinued on 12/13.

## 2019-12-14 NOTE — PLAN OF CARE
D/C note  Final note:  See AVS       12/14/19 1416   Final Note   Assessment Type Final Discharge Note   Anticipated Discharge Disposition Home   Hospital Follow Up  Appt(s) scheduled? Yes   Discharge plans and expectations educations in teach back method with documentation complete? Yes   Right Care Referral Info   Post Acute Recommendation Other   Referral Type PCP and cardiology follow up

## 2019-12-14 NOTE — ASSESSMENT & PLAN NOTE
- on admit  -Suspected this was due to transient diminished cardiac output from afib/rvr but has been found to have acute systolic chf  -Echo showed EF 35%  -Continue strict ins/outs and daily weights and low salt diet.  -Has been started on metoprolol, enalipril and lasix  -Clinically is stable and much improved.  OK for Discharge per Dr. Smith  -Will need to follow up within 3 weeks with Dr. Smith.

## 2019-12-14 NOTE — ASSESSMENT & PLAN NOTE
-Diagnosed several years ago, but has not been on treatment for at least three years.  -EKG shows likely LVH  -Hypertensive on admit but became hypotensive after receiving lopressor and diltiazem  -Monitored closely in ICU and BP now well controlled on metoprolol and enalipril.

## 2019-12-14 NOTE — DISCHARGE SUMMARY
VSS. No SOB. Pt eager & in agreement w/ DC. VU of DC instructions--paperwork passed & explained-- other scripts called to pharmacy per MD. IV removed w/ cath tip intact, WNL. Voiding, ambulating, & tolerating PO well.  To be DCd home w/ family--will be escorted downstairs via  transport team once dressed, ready & ride arrives. Free from falls, injury, or skin breakdown this hospital admission. Pt. In NAD.

## 2019-12-14 NOTE — PLAN OF CARE
Plan of Care reviewed with patient and questions answered. Pt up ad monique in room. Pt voiding per urinal yellow urine. Pt on telemetry running SR in the 80's. Pt free from falls or injury. Denies any c/o discomfort or SOB. Purposeful rounding performed. Tolerating diet well. Safety maintained. Bed in lowest position and locked. Call light in reach. No acute distress noted. VSS.          Shelbi Sebastian RN

## 2019-12-14 NOTE — DISCHARGE SUMMARY
"[facility] Hospital Medicine  Discharge Summary      Patient Name: Ilya Thomas  MRN: [id number]  Admission Date: 12/11/2019  Hospital Length of Stay: 3 days  Discharge Date and Time: No discharge date for patient encounter.  Attending Physician: Kaur Vargas MD   Discharging Provider: Kaur Vargas MD  Primary Care Provider: Елена Cedeno MD      HPI:   Mr. Thomas is a 49 year old man with history of hypertension and medication non-compliance who came in for evaluation of racing heart and codl symptoms for three days.  He states he has had a dry cough for the last three days and took cough syrup with minimal relief.  He denies fever, chills, headache, myalgias, body aches, chest pain and fever.  His mother compliments the history and ntoes that last night he seemed to be coughing more and was working a bit harder to breath.  They went to urgent care this morning and he was noted to have a pulse of 167.  EKG was obtained which showed atrial fibrillation with rvr and he was referred to our ER.  He notes that he could "adams feel" that his heart rate was going fast.  His mother interjects that he was very weak and light headed this morning.  In the ER he was initially hypertensive and found to have afib with rvr.  He was given lopressor with minimal results and then given diltiazem and his hr quickly improved to the 90s.  Subsequently his blood pressure dropped into the upper 80s, but he continued without symptoms of light headedness or shortness of breath even when raising himself up for exam.  He is admitted to the ICU with low suspicious of sepsis for close monitoring, cardizem drip and IV antibiotics for pneumonia.    Procedure(s) (LRB):  ECHOCARDIOGRAM,TRANSESOPHAGEAL (N/A)  CARDIOVERSION (N/A)      Consults:   Consults (From admission, onward)        Status Ordering Provider     Inpatient consult to Cardiology  Once     Provider:  Lara Smith MD    Completed KAUR VARGAS    "  Inpatient consult to Social Work  Once     Provider:  (Not yet assigned)    Completed RANDY BATISTA     Inpatient consult to Social Work  Once     Provider:  (Not yet assigned)    Ordered KAUR ZUNIGA        Hospital Course By Problem:   * Acute systolic congestive heart failure  - on admit  -Suspected this was due to transient diminished cardiac output from afib/rvr but has been found to have acute systolic chf  -Echo showed EF 35%  -Continue strict ins/outs and daily weights and low salt diet.  -Has been started on metoprolol, enalipril and lasix  -Clinically is stable and much improved.  OK for Discharge per Dr. Batista  -Will need to follow up within 3 weeks with Dr. Batista.    Atrial fibrillation with RVR  -Mr. Thomas was admitted to inpatient status in our ICU  -No prior cardiac history noted.  TSH is normal.  Troponin minimally elevated.  No chest pain.  Has history of untreated htn.  -Suspect secondary to coughing and respiratory illness vs hypertensive cardiomyopathy.  -Did not respond to well to lopressor in ER.  HR transiently improved after receiving diltiazem 10 but his blood pressure did drop  -Troponins minimally elevated and down trending most likely due to demand ischemia.  -Treated with cardizem drip on 1st night and converted to NSR early on 12/12.  He was transferred to the floor on 12/12.  -[id number] score of 2 for hypertension and chf.  -Patient has been started on metoprolol and apixaban by Dr. Batista and I discussed the case with him today.    Hypotension  -This was due to lopressor and diltiazem in ER and not sepsis  -AM cortisol normal  Troponins minimally elevated and down-trending.  -Echo reviewed  -BP is well controlled and normal today.    Community acquired pneumonia of right lung  -On admit he was euthermic with normal lactic acid but leukocytosis and evidence of bilateral pneumonia vs pulmonary edema   -In ER blood cultures were obtained and he received  rocephin and azithromycin.  Was noted to be hypotensive in ER likely due to lopressor and diltiazem and not sepsis.  -Procalcitonin is normal.  Cultures negative so far.  -Has was treated with IV levaquin.  -I suspect his pulmonary infiltrates were most likely pulmonary edema and NOT PNEUMONIA.  CXR improving and afebrile with normal WBC.    -Antibiotics discontinued on 12/13.    Essential hypertension  -Diagnosed several years ago, but has not been on treatment for at least three years.  -EKG shows likely LVH  -Hypertensive on admit but became hypotensive after receiving lopressor and diltiazem  -Monitored closely in ICU and BP now well controlled on metoprolol and enalipril.    Final Active Diagnoses:    Diagnosis Date Noted POA    PRINCIPAL PROBLEM:  Acute systolic congestive heart failure [I50.21] 12/11/2019 Yes    Atrial fibrillation with RVR [I48.91] 12/11/2019 Yes    Hypotension [I95.9] 12/11/2019 Yes    Community acquired pneumonia of right lung [J18.9] 12/11/2019 Yes    Elevated troponin [R79.89] 12/11/2019 Yes    Essential hypertension [I10] 12/11/2019 Yes      Problems Resolved During this Admission:       Discharged Condition: fair    Disposition: Home or Self Care    Follow Up:  Follow-up Information     Lara Smith MD In 3 weeks.    Specialty:  Cardiology  Contact information:  [address]  [facility] [address] [phone]                 Patient Instructions:      Diet Cardiac     Notify your health care provider if you experience any of the following:  increased confusion or weakness     Notify your health care provider if you experience any of the following:  persistent dizziness, light-headedness, or visual disturbances     Notify your health care provider if you experience any of the following:  worsening rash     Notify your health care provider if you experience any of the following:  severe persistent headache     Notify your health care provider if you experience any of the  following:  difficulty breathing or increased cough     Notify your health care provider if you experience any of the following:  severe uncontrolled pain     Notify your health care provider if you experience any of the following:  persistent nausea and vomiting or diarrhea     Notify your health care provider if you experience any of the following:  temperature >100.4     Activity as tolerated       Significant Diagnostic Studies: Labs:   BMP:   Recent Labs   Lab 12/13/19  0443 12/14/19  0338   GLU 64* 81    137   K 4.4 4.2    103   CO2 22* 23   BUN 13 16   CREATININE 1.3 1.4   CALCIUM 9.3 9.7   MG 1.9 2.0   , CMP   Recent Labs   Lab 12/13/19  0443 12/14/19  0338    137   K 4.4 4.2    103   CO2 22* 23   GLU 64* 81   BUN 13 16   CREATININE 1.3 1.4   CALCIUM 9.3 9.7   ANIONGAP 11 11   ESTGFRAFRICA >60 >60   EGFRNONAA >60 58*   , CBC   Recent Labs   Lab 12/13/19 0443 12/14/19  0338   WBC 8.30 6.86   HGB 14.6 16.9   HCT 47.9 53.9    219   , Troponin   Recent Labs   Lab 12/11/19  2214   TROPONINI 0.049*    and A1C: No results for input(s): HGBA1C in the last 4320 hours.    Pending Diagnostic Studies:     None         Medications:  Reconciled Home Medications:      Medication List      START taking these medications    benzonatate 100 MG capsule  Commonly known as:  TESSALON  Take 1 capsule (100 mg total) by mouth 3 (three) times daily as needed for Cough.     Eliquis 5 mg Tab  Generic drug:  apixaban  Take 1 tablet (5 mg total) by mouth 2 (two) times daily.     enalapril 10 MG tablet  Commonly known as:  VASOTEC  Take 1 tablet (10 mg total) by mouth 2 (two) times daily.     furosemide 40 MG tablet  Commonly known as:  LASIX  Take 1 tablet (40 mg total) by mouth once daily.  Start taking on:  December 15, 2019     metoprolol tartrate 50 MG tablet  Commonly known as:  LOPRESSOR  Take 1 tablet (50 mg total) by mouth 2 (two) times daily.            Indwelling Lines/Drains at time of  discharge:   Lines/Drains/Airways     Airway                 Airway - Non-Surgical 12/12/19 0830  2 days                Time spent on the discharge of patient: 35 minutes  Patient was seen and examined on the date of discharge and determined to be suitable for discharge.         Nicholas Vargas MD  Department of Hospital Medicine  [facility]

## 2019-12-16 LAB
BACTERIA BLD CULT: NORMAL
BACTERIA BLD CULT: NORMAL

## 2019-12-24 ENCOUNTER — OFFICE VISIT (OUTPATIENT)
Dept: CARDIOLOGY | Facility: CLINIC | Age: 49
End: 2019-12-24
Payer: COMMERCIAL

## 2019-12-24 VITALS
HEIGHT: 68 IN | BODY MASS INDEX: 20.16 KG/M2 | WEIGHT: 133.06 LBS | HEART RATE: 68 BPM | SYSTOLIC BLOOD PRESSURE: 100 MMHG | DIASTOLIC BLOOD PRESSURE: 74 MMHG

## 2019-12-24 DIAGNOSIS — I48.91 ATRIAL FIBRILLATION WITH RVR: ICD-10-CM

## 2019-12-24 DIAGNOSIS — I50.21 ACUTE SYSTOLIC CONGESTIVE HEART FAILURE: Primary | ICD-10-CM

## 2019-12-24 DIAGNOSIS — R79.89 ELEVATED TROPONIN: ICD-10-CM

## 2019-12-24 DIAGNOSIS — I10 ESSENTIAL HYPERTENSION: ICD-10-CM

## 2019-12-24 PROCEDURE — 99999 PR PBB SHADOW E&M-EST. PATIENT-LVL III: CPT | Mod: PBBFAC,,, | Performed by: INTERNAL MEDICINE

## 2019-12-24 PROCEDURE — 99214 OFFICE O/P EST MOD 30 MIN: CPT | Mod: S$GLB,,, | Performed by: INTERNAL MEDICINE

## 2019-12-24 PROCEDURE — 99214 PR OFFICE/OUTPT VISIT, EST, LEVL IV, 30-39 MIN: ICD-10-PCS | Mod: S$GLB,,, | Performed by: INTERNAL MEDICINE

## 2019-12-24 PROCEDURE — 99999 PR PBB SHADOW E&M-EST. PATIENT-LVL III: ICD-10-PCS | Mod: PBBFAC,,, | Performed by: INTERNAL MEDICINE

## 2019-12-24 RX ORDER — METOPROLOL TARTRATE 50 MG/1
50 TABLET ORAL 2 TIMES DAILY
Qty: 60 TABLET | Refills: 11 | Status: SHIPPED | OUTPATIENT
Start: 2019-12-24 | End: 2020-09-18 | Stop reason: SDUPTHER

## 2019-12-24 RX ORDER — ENALAPRIL MALEATE 10 MG/1
10 TABLET ORAL 2 TIMES DAILY
Qty: 60 TABLET | Refills: 11 | Status: SHIPPED | OUTPATIENT
Start: 2019-12-24 | End: 2020-09-18 | Stop reason: SDUPTHER

## 2019-12-24 NOTE — LETTER
December 24, 2019      Max Thompson PA-C  [address] [facility] [address] [facility] [address]           [place] - Cardiology  [address] [facility]  [place] [address]  Phone: [phone]          Patient: Ilya Thomas   MR Number: [id number]   YOB: 1970   Date of Visit: 12/24/2019       Dear Max Thompson:    Thank you for referring Iyla Thomas to me for evaluation. Attached you will find relevant portions of my assessment and plan of care.    If you have questions, please do not hesitate to call me. I look forward to following Ilya Thomas along with you.    Sincerely,    Jesus Gonzalez MD    Enclosure  CC:  No Recipients    If you would like to receive this communication electronically, please contact [email or web address] or [phone] to request more information on "[employer] Link access.    For providers and/or their staff who would like to refer a patient to [facility], please contact us through our one-stop-shop provider referral line, [facility], at [phone].    If you feel you have received this communication in error or would no longer like to receive these types of communications, please e-mail [email or web address]

## 2019-12-24 NOTE — PROGRESS NOTES
Subjective:   Chief Complaint: Atrial Fibrillation  Last Clinic Visit: New Patient    History of Present Illness: Ilya Thomas is a 49 y.o. gentleman with hypertension, who recently developed atrial fibrillation with RVR, and found to have cardiomyopathy.  He reports 3 days of cough, cold, congestion, did not take any OTC medication, but went into the ER ultimately and was found to be in AFib with RVR.  He became hypotensive with diltiazem, briefly admitted to the ICU, ultimately returned to sinus rhythm.  He had an echocardiogram performed, possibly at the time of RVR, with depressed ejection fraction, global, along with moderate MR from prolapse segment.  No prior history of cardiomyopathy, no prior history of AFib, no prior history of MR.  He reports that since discharge he feels back to normal, no complaints.  He is relatively flat affect, and comes in to clinic today with his mother.  Since discharge, denies any shortness of breath, no lower extremity edema, no lightheadedness, palpitations, no tachycardia.  He was prescribed Eliquis, metoprolol, enalapril, and Lasix at time of discharge, has been compliant with them.    Dx:  Atrial fibrillation with RVR  Systolic congestive heart failure, ejection fraction 35%  Hypertension  Mitral regurgitation    Review of Systems   Constitution: Negative.   HENT: Negative.    Eyes: Negative.    Cardiovascular: Negative.    Respiratory: Negative.    Hematologic/Lymphatic: Negative.    Skin: Negative.    Musculoskeletal: Negative.    Gastrointestinal: Negative.    Genitourinary: Negative.      Medications:  Current Outpatient Medications on File Prior to Visit   Medication Sig    benzonatate (TESSALON) 100 MG capsule Take 1 capsule (100 mg total) by mouth 3 (three) times daily as needed for Cough.    [DISCONTINUED] apixaban (ELIQUIS) 5 mg Tab Take 1 tablet (5 mg total) by mouth 2 (two) times daily.    [DISCONTINUED] enalapril (VASOTEC) 10 MG tablet Take 1 tablet (10 mg  "total) by mouth 2 (two) times daily.    [DISCONTINUED] furosemide (LASIX) 40 MG tablet Take 1 tablet (40 mg total) by mouth once daily.    [DISCONTINUED] metoprolol tartrate (LOPRESSOR) 50 MG tablet Take 1 tablet (50 mg total) by mouth 2 (two) times daily.     Current Facility-Administered Medications on File Prior to Visit   Medication    0.9%  NaCl infusion     Family History:  Denies any family history of cardiomyopathy    Social History:  Ilya reports that he has never smoked. He does not have any smokeless tobacco history on file. He reports that he does not drink alcohol or use drugs.    Objective:   /74   Pulse 68   Ht 5' 8" (1.727 m)   Wt 60.3 kg (133 lb 0.8 oz)   BMI 20.23 kg/m²     Physical Exam   Constitutional: He is oriented to person, place, and time and well-developed, well-nourished, and in no distress. No distress.   HENT:   Head: Normocephalic and atraumatic.   Mouth/Throat: No oropharyngeal exudate.   Eyes: EOM are normal. No scleral icterus.   Neck: No JVD present. No tracheal deviation present. No thyromegaly present.   Cardiovascular: Normal rate and regular rhythm. Exam reveals no gallop and no friction rub.   Murmur (2/6 holosystolic murmur heard best at the apex.) heard.  Pulmonary/Chest: Effort normal and breath sounds normal. No respiratory distress. He has no wheezes. He has no rales. He exhibits no tenderness.   Abdominal: Soft. He exhibits no distension. There is no tenderness. There is no rebound and no guarding.   Musculoskeletal: Normal range of motion. He exhibits no edema.   Neurological: He is alert and oriented to person, place, and time.   Skin: Skin is warm and dry. He is not diaphoretic. No erythema.   Psychiatric: Affect normal.     EKG:  My independent visualization of most recent EKG is normal sinus rhythm    TTE:  12/12/2018  · Moderately decreased left ventricular systolic function. The estimated ejection fraction is 35%  · Moderate global hypokinetic wall " motion.  · Mild eccentric left ventricular hypertrophy.  · Normal LV diastolic function.  · Severe left atrial enlargement.  · Mildly reduced right ventricular systolic function.  · Moderate right atrial enlargement.  · The mitral valve shows mild prolapse of the anterior mitral leaflet.  · Mild mitral prolapse of the posterior mitral leaflet.  · Mild-to-moderate mitral regurgitation.  · Mild tricuspid regurgitation.  · No pulmonary hypertension present.  · The estimated PA systolic pressure is 31 mm Hg  · Intermediate central venous pressure (8 mm Hg).   Lipids:       Renal:  Recent Labs   Lab 12/14/19  0338   Creatinine 1.4   Potassium 4.2   CO2 23   BUN, Bld 16     Liver:  Recent Labs   Lab 12/12/19  0416   AST 29   ALT 44       Assessment:     1. Acute systolic congestive heart failure    2. Atrial fibrillation with RVR    3. Elevated troponin    4. Essential hypertension      Plan:   1. Acute systolic congestive heart failure  Suspect some of this is related atrial fibrillation with RVR, but now in sinus rhythm, relatively asymptomatic, but does continue to have murmur thus with comment of redundant mitral valve, suspect that some MR could be chronic.  Would like to establish EF after a prolonged period of sinus rhythm, and some mild goal-directed medical therapy.  If EF persistently depressed will workup ischemia further, and crank up goal-directed medical therapy.  He also appears to be relatively euvolemic, unsure if he needs standing diuretics at this time.  - enalapril (VASOTEC) 10 MG tablet; Take 1 tablet (10 mg total) by mouth 2 (two) times daily.  Dispense: 60 tablet; Refill: 11  - apixaban (ELIQUIS) 5 mg Tab; Take 1 tablet (5 mg total) by mouth 2 (two) times daily.  Dispense: 60 tablet; Refill: 11  - metoprolol tartrate (LOPRESSOR) 50 MG tablet; Take 1 tablet (50 mg total) by mouth 2 (two) times daily.  Dispense: 60 tablet; Refill: 11  - Echo Color Flow Doppler? Yes; Future    2. Atrial fibrillation  with RVR  Continue Eliquis, continue metoprolol    3. Elevated troponin      4. Essential hypertension  Blood pressure well controlled today      Follow up in about 3 months (around 3/24/2020).

## 2019-12-30 ENCOUNTER — TELEPHONE (OUTPATIENT)
Dept: CARDIOLOGY | Facility: CLINIC | Age: 49
End: 2019-12-30

## 2019-12-30 NOTE — TELEPHONE ENCOUNTER
----- Message from Fátima Gutierres sent at 12/30/2019 10:58 AM CST -----  Contact: Shanita pt mother [phone]  Pt was told he could return back to work by the doctor, but he needs a letter from the doctor. The pt would like to know if he can pick it up from the [employer] office?    Thanks

## 2019-12-30 NOTE — TELEPHONE ENCOUNTER
Patient's wife came by and picked up the Return-to Work slip.    From: Jesus Gonzalez MD   Sent: 12/30/2019  12:30 PM CST   To: Kerrie Matos MA, Mayte Zavala MA     Yes it's fine, I can fill one out today if he wants       Jesus     ----- Message -----   From: Kerrie Matos MA   Sent: 12/27/2019   2:18 PM CST   To: Jesus Gonzalez MD     Is it ok for him to go back to work.   ----- Message -----   From: Yeni Cole MA   Sent: 12/27/2019   1:52 PM CST   To: Carlos KWAN Staff     Pt is calling to get a return to work slip to return to work on Mon.12/30.Saw  on 12/24.Please call when ready to .     Fátima KWAN Staff   Caller: Shanita pt mother [phone] (Today, 10:58 AM)             Pt was told he could return back to work by the doctor, but he needs a letter from the doctor. The pt would like to know if he can pick it up from the [place] office?     Thanks

## 2020-01-09 ENCOUNTER — OFFICE VISIT (OUTPATIENT)
Dept: INTERNAL MEDICINE | Facility: CLINIC | Age: 50
End: 2020-01-09
Payer: COMMERCIAL

## 2020-01-09 VITALS
BODY MASS INDEX: 20.78 KG/M2 | WEIGHT: 137.13 LBS | SYSTOLIC BLOOD PRESSURE: 136 MMHG | HEART RATE: 60 BPM | DIASTOLIC BLOOD PRESSURE: 66 MMHG | OXYGEN SATURATION: 95 % | HEIGHT: 68 IN

## 2020-01-09 DIAGNOSIS — Z76.89 ENCOUNTER TO ESTABLISH CARE WITH NEW DOCTOR: Primary | ICD-10-CM

## 2020-01-09 DIAGNOSIS — Z13.220 SCREENING FOR LIPID DISORDERS: ICD-10-CM

## 2020-01-09 DIAGNOSIS — Z12.11 SCREEN FOR COLON CANCER: ICD-10-CM

## 2020-01-09 DIAGNOSIS — I50.22 CHRONIC SYSTOLIC HEART FAILURE: ICD-10-CM

## 2020-01-09 DIAGNOSIS — I10 ESSENTIAL HYPERTENSION: ICD-10-CM

## 2020-01-09 DIAGNOSIS — I48.91 ATRIAL FIBRILLATION, UNSPECIFIED TYPE: ICD-10-CM

## 2020-01-09 PROBLEM — R79.89 ELEVATED TROPONIN: Status: RESOLVED | Noted: 2019-12-11 | Resolved: 2020-01-09

## 2020-01-09 PROBLEM — I50.21 ACUTE SYSTOLIC CONGESTIVE HEART FAILURE: Status: RESOLVED | Noted: 2019-12-11 | Resolved: 2020-01-09

## 2020-01-09 PROBLEM — I95.9 HYPOTENSION: Status: RESOLVED | Noted: 2019-12-11 | Resolved: 2020-01-09

## 2020-01-09 PROBLEM — J18.9 COMMUNITY ACQUIRED PNEUMONIA OF RIGHT LUNG: Status: RESOLVED | Noted: 2019-12-11 | Resolved: 2020-01-09

## 2020-01-09 PROCEDURE — 99204 OFFICE O/P NEW MOD 45 MIN: CPT | Mod: S$GLB,,, | Performed by: FAMILY MEDICINE

## 2020-01-09 PROCEDURE — 99204 PR OFFICE/OUTPT VISIT, NEW, LEVL IV, 45-59 MIN: ICD-10-PCS | Mod: S$GLB,,, | Performed by: FAMILY MEDICINE

## 2020-01-09 PROCEDURE — 99999 PR PBB SHADOW E&M-EST. PATIENT-LVL III: ICD-10-PCS | Mod: PBBFAC,,, | Performed by: FAMILY MEDICINE

## 2020-01-09 PROCEDURE — 99999 PR PBB SHADOW E&M-EST. PATIENT-LVL III: CPT | Mod: PBBFAC,,, | Performed by: FAMILY MEDICINE

## 2020-01-09 NOTE — LETTER
January 9, 2020      Max Thompson PA-C  [address]  [place] [address]           [facility] [id number]  [facility] [address]  Phone: [phone]  Fax: [phone]          Patient: Ilya Thomas   MR Number: [id number]   YOB: 1970   Date of Visit: 1/9/2020       Dear Max Thompson:    Thank you for referring Ilya Thomas to me for evaluation. Attached you will find relevant portions of my assessment and plan of care.    If you have questions, please do not hesitate to call me. I look forward to following Ilya Thomas along with you.    Sincerely,    Gemini Vences MD    Enclosure  CC:  No Recipients    If you would like to receive this communication electronically, please contact [email or web address] or [phone] to request more information on [employer] Link access.    For providers and/or their staff who would like to refer a patient to [facility], please contact us through our one-stop-shop provider referral line, [facility], at [phone].    If you feel you have received this communication in error or would no longer like to receive these types of communications, please e-mail [email or web address]

## 2020-01-09 NOTE — PROGRESS NOTES
Subjective:       Patient ID: Ilya Thomas is a 49 y.o. male.    Chief Complaint: Establish Care    HPI  This patient is new to me.   Ilya Thomas is a 49 y.o. year old male with hypertension, AFib, cardiomyopathy, cardiac murmur, mitral regurgitation who presents today to establish care.    Patient admitted December 11, 2019 and was found to be AFib RVR.  He was given diltiazem and developed hypotension.  He was admitted to the ICU for monitoring.  He was also treated with IV antibiotics for pneumonia.  ECHO 12/12/19:  · Moderately decreased left ventricular systolic function. The estimated ejection fraction is 35%  · Moderate global hypokinetic wall motion.  · Mild eccentric left ventricular hypertrophy.  · Normal LV diastolic function.  · Severe left atrial enlargement.  · Mildly reduced right ventricular systolic function.  · Moderate right atrial enlargement.  · The mitral valve shows mild prolapse of the anterior mitral leaflet.  · Mild mitral prolapse of the posterior mitral leaflet.  · Mild-to-moderate mitral regurgitation.  · Mild tricuspid regurgitation.  · No pulmonary hypertension present.  · The estimated PA systolic pressure is 31 mm Hg  · Intermediate central venous pressure (8 mm Hg).  He was discharged on metoprolol, enalapril, Lasix, Eliquis.  He has since followed up with Cardiology, Dr. Gonzalez, as an outpatient.  He has another echocardiogram planned.  Patient is no longer taking Lasix due to the fact that he is not fluid overloaded and per Cardiology recommendation.    No results found for: CHOL  No results found for: HDL  No results found for: LDLCALC  No results found for: TRIG  No results found for: CHOLHDL    Health Maintenance  Colon Cancer Screening: never before   Prostate Cancer Screening: n/a  Hepatitis C screening: n/a  Flu vaccine: refused  Tetanus vaccine: refused  PNA vaccine: n/a  Shingles vaccine:     I personally reviewed Past Medical History, Past Surgical History,  "Social History, and Family History    Review of Systems   Constitutional: Negative for chills, fatigue, fever and unexpected weight change.   HENT: Negative for congestion, hearing loss, rhinorrhea and sore throat.    Eyes: Negative for visual disturbance.   Respiratory: Negative for cough, shortness of breath and wheezing.    Cardiovascular: Negative for chest pain, palpitations and leg swelling.   Gastrointestinal: Negative for abdominal pain, constipation, diarrhea, nausea and vomiting.   Genitourinary: Negative for dysuria, frequency and urgency.   Musculoskeletal: Negative for arthralgias and myalgias.   Skin: Negative for rash.   Neurological: Negative for dizziness, syncope and headaches.   Psychiatric/Behavioral: Negative for dysphoric mood and sleep disturbance. The patient is not nervous/anxious.        Objective:      Vitals:    01/09/20 1319   BP: 136/66   Pulse: 60   SpO2: 95%   Weight: 62.2 kg (137 lb 2 oz)   Height: 5' 8" (1.727 m)     Physical Exam   Constitutional: He is oriented to person, place, and time. He appears well-developed and well-nourished. No distress.   HENT:   Head: Normocephalic and atraumatic.   Right Ear: Hearing normal.   Left Ear: Hearing normal.   Nose: Nose normal.   Mouth/Throat: Oropharynx is clear and moist and mucous membranes are normal. Normal dentition. No oropharyngeal exudate.   Eyes: Pupils are equal, round, and reactive to light. Conjunctivae and lids are normal.   Neck: Normal range of motion. No thyroid mass and no thyromegaly present.   Cardiovascular: Normal rate, regular rhythm, S1 normal, S2 normal and intact distal pulses.   Murmur heard.   Systolic murmur is present.  No lower extremity edema   Pulmonary/Chest: Effort normal and breath sounds normal. No respiratory distress.   Abdominal: Soft. Bowel sounds are normal. There is no tenderness.   Lymphadenopathy:     He has no cervical adenopathy.        Right: No supraclavicular adenopathy present.        Left: " No supraclavicular adenopathy present.   Neurological: He is alert and oriented to person, place, and time. He is not disoriented.   Skin: Skin is warm and dry. No rash noted.   Psychiatric: He has a normal mood and affect. His behavior is normal. Thought content normal.   Nursing note and vitals reviewed.      Assessment:       1. Encounter to establish care with new doctor    2. Screening for lipid disorders    3. Screen for colon cancer    4. Essential hypertension    5. Chronic systolic heart failure    6. Atrial fibrillation, unspecified type        Plan:   Ilya was seen today for establish care.    Diagnoses and all orders for this visit:    Encounter to establish care with new doctor    Screening for lipid disorders  -     Lipid panel; Future    Screen for colon cancer  -     Fecal Immunochemical Test (iFOBT); Future    Essential hypertension  Controlled. Continue current medication regimen.   -     Comprehensive metabolic panel; Future  -     Lipid panel; Future    Chronic systolic heart failure  Patient euvolemic today and asymptomatic.  Continue current medication regimen.  Echocardiogram ordered per Cardiology for next month.  Follow-up with Cardiology.    Atrial fibrillation, unspecified type  Continue current medication regimen.  Patient rate and rhythm controlled currently.

## 2020-02-24 ENCOUNTER — CLINICAL SUPPORT (OUTPATIENT)
Dept: CARDIOLOGY | Facility: CLINIC | Age: 50
End: 2020-02-24
Attending: INTERNAL MEDICINE
Payer: COMMERCIAL

## 2020-02-24 VITALS
WEIGHT: 137 LBS | BODY MASS INDEX: 20.76 KG/M2 | SYSTOLIC BLOOD PRESSURE: 130 MMHG | DIASTOLIC BLOOD PRESSURE: 86 MMHG | HEART RATE: 56 BPM | HEIGHT: 68 IN

## 2020-02-24 DIAGNOSIS — I50.21 ACUTE SYSTOLIC CONGESTIVE HEART FAILURE: ICD-10-CM

## 2020-02-24 LAB
ASCENDING AORTA: 3.05 CM
AV INDEX (PROSTH): 0.78
AV MEAN GRADIENT: 4 MMHG
AV PEAK GRADIENT: 7 MMHG
AV VALVE AREA: 2.94 CM2
AV VELOCITY RATIO: 0.85
BSA FOR ECHO PROCEDURE: 1.73 M2
CV ECHO LV RWT: 0.3 CM
DOP CALC AO PEAK VEL: 1.31 M/S
DOP CALC AO VTI: 26.82 CM
DOP CALC LVOT AREA: 3.8 CM2
DOP CALC LVOT DIAMETER: 2.19 CM
DOP CALC LVOT PEAK VEL: 1.11 M/S
DOP CALC LVOT STROKE VOLUME: 78.88 CM3
DOP CALCLVOT PEAK VEL VTI: 20.95 CM
E WAVE DECELERATION TIME: 132.58 MSEC
E/A RATIO: 2.29
E/E' RATIO: 8.89 M/S
ECHO LV POSTERIOR WALL: 0.85 CM (ref 0.6–1.1)
FRACTIONAL SHORTENING: 35 % (ref 28–44)
INTERVENTRICULAR SEPTUM: 0.97 CM (ref 0.6–1.1)
IVRT: 0.07 MSEC
LA MAJOR: 6.3 CM
LA MINOR: 6.34 CM
LA WIDTH: 5.5 CM
LEFT ATRIUM SIZE: 4.73 CM
LEFT ATRIUM VOLUME INDEX: 80.3 ML/M2
LEFT ATRIUM VOLUME: 139.75 CM3
LEFT INTERNAL DIMENSION IN SYSTOLE: 3.62 CM (ref 2.1–4)
LEFT VENTRICLE DIASTOLIC VOLUME INDEX: 83.97 ML/M2
LEFT VENTRICLE DIASTOLIC VOLUME: 146.14 ML
LEFT VENTRICLE MASS INDEX: 112 G/M2
LEFT VENTRICLE SYSTOLIC VOLUME INDEX: 31.7 ML/M2
LEFT VENTRICLE SYSTOLIC VOLUME: 55.11 ML
LEFT VENTRICULAR INTERNAL DIMENSION IN DIASTOLE: 5.6 CM (ref 3.5–6)
LEFT VENTRICULAR MASS: 194.37 G
LV LATERAL E/E' RATIO: 6.67 M/S
LV SEPTAL E/E' RATIO: 13.33 M/S
MV PEAK A VEL: 0.35 M/S
MV PEAK E VEL: 0.8 M/S
PISA TR MAX VEL: 3.21 M/S
PULM VEIN S/D RATIO: 0.51
PV PEAK D VEL: 0.88 M/S
PV PEAK S VEL: 0.45 M/S
RA MAJOR: 5.09 CM
RA PRESSURE: 3 MMHG
RA WIDTH: 3.79 CM
RIGHT VENTRICULAR END-DIASTOLIC DIMENSION: 3.34 CM
SINUS: 3.53 CM
STJ: 3 CM
TDI LATERAL: 0.12 M/S
TDI SEPTAL: 0.06 M/S
TDI: 0.09 M/S
TR MAX PG: 41 MMHG
TRICUSPID ANNULAR PLANE SYSTOLIC EXCURSION: 2.19 CM
TV REST PULMONARY ARTERY PRESSURE: 44 MMHG

## 2020-02-24 PROCEDURE — 93306 ECHO (CUPID ONLY): ICD-10-PCS | Mod: S$GLB,,, | Performed by: INTERNAL MEDICINE

## 2020-02-24 PROCEDURE — 99999 PR PBB SHADOW E&M-EST. PATIENT-LVL II: CPT | Mod: PBBFAC,,,

## 2020-02-24 PROCEDURE — 93306 TTE W/DOPPLER COMPLETE: CPT | Mod: S$GLB,,, | Performed by: INTERNAL MEDICINE

## 2020-02-24 PROCEDURE — 99999 PR PBB SHADOW E&M-EST. PATIENT-LVL II: ICD-10-PCS | Mod: PBBFAC,,,

## 2020-02-26 ENCOUNTER — PATIENT MESSAGE (OUTPATIENT)
Dept: CARDIOLOGY | Facility: CLINIC | Age: 50
End: 2020-02-26

## 2020-02-26 DIAGNOSIS — I50.22 CHRONIC SYSTOLIC HEART FAILURE: Primary | ICD-10-CM

## 2020-02-26 DIAGNOSIS — I34.1 MITRAL VALVE PROLAPSE: ICD-10-CM

## 2020-02-27 NOTE — TELEPHONE ENCOUNTER
called patient's mother, as well as patient.  Explained diagnosis of mitral valve prolapse, but improved ejection fraction.  No indication for surgery at this time, will continue to watch with yearly echoes, follow-up in clinic in one month to assess efficacy of current medical therapy.    -Jesus Gonzalez

## 2020-03-18 ENCOUNTER — OFFICE VISIT (OUTPATIENT)
Dept: CARDIOLOGY | Facility: CLINIC | Age: 50
End: 2020-03-18
Payer: COMMERCIAL

## 2020-03-18 DIAGNOSIS — I10 ESSENTIAL HYPERTENSION: ICD-10-CM

## 2020-03-18 DIAGNOSIS — I34.1 MITRAL VALVE PROLAPSE: ICD-10-CM

## 2020-03-18 DIAGNOSIS — I50.22 CHRONIC SYSTOLIC HEART FAILURE: ICD-10-CM

## 2020-03-18 DIAGNOSIS — I48.0 PAROXYSMAL ATRIAL FIBRILLATION: Primary | ICD-10-CM

## 2020-03-18 PROCEDURE — 99214 PR OFFICE/OUTPT VISIT, EST, LEVL IV, 30-39 MIN: ICD-10-PCS | Mod: 95,,, | Performed by: INTERNAL MEDICINE

## 2020-03-18 PROCEDURE — 99214 OFFICE O/P EST MOD 30 MIN: CPT | Mod: 95,,, | Performed by: INTERNAL MEDICINE

## 2020-03-18 NOTE — PROGRESS NOTES
Subjective:   Chief Complaint: cardiomyopathy, AFib RVR, paroxysmal AFib, mitral valve prolapse.: No chief complaint on file.  Last Clinic Visit: 12/24/2019     History of Present  Illness: Ilya Thomas is a 49 y.o. gentleman with hypertension, paroxysmal atrial fibrillation, cardiomyopathy, mitral valve prolapse, who presents for follow-up.  Interval History:  We obtained follow-up echocardiogram since we saw him last, and ejection fraction normalized.  Noted to have classic mitral valve prolapse, with moderate MR, but normal LV dimensions.  He reports doing very well since we saw him last, denies any bleeding on apixaban.  Denies any dyspnea on exertion, no shortness of breath, no weight gain, no lower extremity edema, no PND, no palpitations, no syncope, no presyncope.  Walking on a regular basis, and denies any significant difficulty with this.  We did stop his furosemide after we saw him last, given normalization of ejection fraction, and no heart failure symptoms.  Blood pressure elevated today at home on home cuff, but notably is a wrist cuff.  Inquires about diet    12/24/2019  He reports 3 days of cough, cold, congestion, did not take any OTC medication, but went into the ER ultimately and was found to be in AFib with RVR.  He became hypotensive with diltiazem, briefly admitted to the ICU, ultimately returned to sinus rhythm.  He had an echocardiogram performed, possibly at the time of RVR, with depressed ejection fraction, global, along with moderate MR from prolapse segment.  No prior history of cardiomyopathy, no prior history of AFib, no prior history of MR.  He reports that since discharge he feels back to normal, no complaints.  He is relatively flat affect, and comes in to clinic today with his mother.  Since discharge, denies any shortness of breath, no lower extremity edema, no lightheadedness, palpitations, no tachycardia.  He was prescribed Eliquis, metoprolol, enalapril, and Lasix at time of  discharge, has been compliant with them.    Dx:  Atrial fibrillation with RVR  Systolic congestive heart failure, ejection fraction 35%  Hypertension  Mitral regurgitation    Review of Systems   Constitution: Negative.   HENT: Negative.    Eyes: Negative.    Cardiovascular: Negative.    Respiratory: Negative.    Hematologic/Lymphatic: Negative.    Skin: Negative.    Musculoskeletal: Negative.    Gastrointestinal: Negative.    Genitourinary: Negative.      Medications:  Current Outpatient Medications on File Prior to Visit   Medication Sig    apixaban (ELIQUIS) 5 mg Tab Take 1 tablet (5 mg total) by mouth 2 (two) times daily.    enalapril (VASOTEC) 10 MG tablet Take 1 tablet (10 mg total) by mouth 2 (two) times daily.    metoprolol tartrate (LOPRESSOR) 50 MG tablet Take 1 tablet (50 mg total) by mouth 2 (two) times daily.     Current Facility-Administered Medications on File Prior to Visit   Medication    0.9%  NaCl infusion     Family History:  Denies any family history of cardiomyopathy    Social History:  Ilya reports that he has never smoked. He has never used smokeless tobacco. He reports that he does not drink alcohol or use drugs.    Objective:   There were no vitals taken for this visit.  Patient reports blood pressure at home on wrist cuff 150/95  Physical Exam   Constitutional: He is oriented to person, place, and time and well-developed, well-nourished, and in no distress. No distress.   HENT:   Head: Normocephalic and atraumatic.   Neurological: He is alert and oriented to person, place, and time.   Skin: He is not diaphoretic.   Psychiatric: Affect normal.     EKG:  My independent visualization of most recent EKG is normal sinus rhythm    TTE:  12/12/2018  · Moderately decreased left ventricular systolic function. The estimated ejection fraction is 35%  · Moderate global hypokinetic wall motion.  · Mild eccentric left ventricular hypertrophy.  · Normal LV diastolic function.  · Severe left atrial  enlargement.  · Mildly reduced right ventricular systolic function.  · Moderate right atrial enlargement.  · The mitral valve shows mild prolapse of the anterior mitral leaflet.  · Mild mitral prolapse of the posterior mitral leaflet.  · Mild-to-moderate mitral regurgitation.  · Mild tricuspid regurgitation.  · No pulmonary hypertension present.  · The estimated PA systolic pressure is 31 mm Hg  · Intermediate central venous pressure (8 mm Hg).   02/24/2020  · Normal left ventricular systolic function. The estimated ejection fraction is 55%.  · No wall motion abnormalities.  · Normal right ventricular systolic function.  · Biatrial enlargement.  · Classic bileaflet MVP (a>p)  · Moderate, mid-late systolic, eccentric, posteriorly diected mitral regurgitation.  · Mild tricuspid regurgitation.  · The estimated PA systolic pressure is 44 mmHg.  · Pulmonary hypertension present.  · Normal central venous pressure (3 mmHg).     Lipids:       Renal:  Recent Labs   Lab 12/14/19  0338   Creatinine 1.4   Potassium 4.2   CO2 23   BUN, Bld 16     Liver:  Recent Labs   Lab 12/12/19  0416   AST 29   ALT 44       Assessment:     1. Paroxysmal atrial fibrillation    2. Mitral valve prolapse    3. Essential hypertension    4. Chronic systolic heart failure      Plan:   1. Paroxysmal atrial fibrillation  No symptoms of recurrent episodes, suspect continued sinus rhythm.  Continue Eliquis, tolerating well.    2. Mitral valve prolapse  Normal LV dimensions, no recurrent evidence of heart failure, EF improved, will continue to monitor periodically echo every 3-5 years    3. Essential hypertension  Blood pressure markedly elevated at home, but do not trust home cuff.  For now, instructed them to recheck blood pressure over the next 2 weeks, if persistently elevated, will consider trying to confirm with a arm cuff.  Stopped furosemide after we saw him last, the loss of this diuretic could be contributing to elevated blood pressure at this  point.    4. Chronic systolic heart failure  Ejection fraction normalized, continue metoprolol, ACE-inhibitor, as blood pressure still an issue.    Follow up in about 6 months (around 9/18/2020).    The patient location is:  [place]  The chief complaint leading to consultation is:  Paroxysmal atrial fibrillation, cardiomyopathy, hypertension, mitral valve prolapse  Visit type: Virtual visit with synchronous audio and video  Total time spent with patient:  30 min  Each patient to whom he or she provides medical services by telemedicine is:  (1) informed of the relationship between the physician and patient and the respective role of any other health care provider with respect to management of the patient; and (2) notified that he or she may decline to receive medical services by telemedicine and may withdraw from such care at any time.    Notes:  As above

## 2020-04-01 ENCOUNTER — TELEPHONE (OUTPATIENT)
Dept: CARDIOLOGY | Facility: CLINIC | Age: 50
End: 2020-04-01

## 2020-04-01 DIAGNOSIS — I10 ESSENTIAL HYPERTENSION: Primary | ICD-10-CM

## 2020-04-01 NOTE — TELEPHONE ENCOUNTER
Dr Carlos Barrera I called patient back and wife gave me the following  b/p readings:     3/18   Am - 145/103        Pm - 138/102  3/19   Am - 159/99          Pm  - 140/88  3/20   Am - 166/106        Pm  - 133/ 91  3/21   Am - 161/101        Pm  - 144/95  3/22   Am -  165/103       Pm  - 151/95  3/23   Am -  175/117       Pm  - 159/103   3/24   Am  - 178/121       Pm  -  138/93  3/25   Am  - 146/94         Pm  -  146/98  3/26   Am  - 151/100       Pm  -  134/91  3/27   Am  - 157/104       Pm -  136/90  3/28   Am  - 146/98         Pm  -  136/95  3/29   Am  - 154/109       Pm  -  139/93  3/30   Am  -  155/105      Pm  - 129/86  3/31   Am  -  139/99        Pm  -  140/94  4/1     Am  -  139/96

## 2020-04-01 NOTE — TELEPHONE ENCOUNTER
----- Message from Edwige Davison sent at 4/1/2020  2:31 PM CDT -----  Contact: Mother  Calling to give two week report of Pt's blood pressure as request by Dr Gonzalez.  Thanks     [phone]

## 2020-04-03 RX ORDER — HYDROCHLOROTHIAZIDE 25 MG/1
25 TABLET ORAL DAILY
Qty: 30 TABLET | Refills: 11 | Status: SHIPPED | OUTPATIENT
Start: 2020-04-03 | End: 2020-09-18 | Stop reason: SDUPTHER

## 2020-04-15 ENCOUNTER — TELEPHONE (OUTPATIENT)
Dept: CARDIOLOGY | Facility: CLINIC | Age: 50
End: 2020-04-15

## 2020-04-15 NOTE — TELEPHONE ENCOUNTER
"Returned patient's mother's call and received most recent b/p readings. Says b/p " has been much better since Dr Gonzalez adjusted meds".         RENÉ Baldwin Staff   Caller: Pt.mother (Today,  3:59 PM)             Please call pt. Mother she would like to talk to you about pt. B/p reading.Thank you [phone]        "

## 2020-04-15 NOTE — TELEPHONE ENCOUNTER
----- Message from Miguelina Patel MA sent at 4/15/2020  3:59 PM CDT -----  Contact: Pt.mother  Please call pt. Mother she would like to talk to you about pt. B/p reading.Thank you [phone]

## 2020-04-17 ENCOUNTER — TELEPHONE (OUTPATIENT)
Dept: CARDIOLOGY | Facility: CLINIC | Age: 50
End: 2020-04-17

## 2020-04-17 NOTE — TELEPHONE ENCOUNTER
"Pt's Mom called to report recent b/p readings as follows:    4/5      Am   122/83   p 73        Pm    144/94    p 86  4/6      Am   131/90   p 66        Pm    137/92    p 75  4/7      Am   141/96   p 59        Pm     25/85     p 86  4/8      Am   143/98   p 84        Pm     143/94   p 78  4/9      Am   142/90   p 61        Pm     132/87   p 75  4/10    am   138/92   p 73        Pm     145/98   p 76  4/11    Am   135/87   p 67        Pm     122/79   p 73  4/12    Am   128/84   p 65            4/13    am   128/88   p 64        Pm      123/82   p 71  4/14    Am   137/90   p 73        Pm      136/90   p 80  4/16    Am   119/83   p 73        Pm      136/90   p 80    She said b/p " has been much better since you adjusted meds".     "

## 2020-09-18 ENCOUNTER — OFFICE VISIT (OUTPATIENT)
Dept: CARDIOLOGY | Facility: CLINIC | Age: 50
End: 2020-09-18
Payer: COMMERCIAL

## 2020-09-18 VITALS
BODY MASS INDEX: 22.01 KG/M2 | HEIGHT: 68 IN | HEART RATE: 66 BPM | DIASTOLIC BLOOD PRESSURE: 73 MMHG | SYSTOLIC BLOOD PRESSURE: 126 MMHG | WEIGHT: 145.19 LBS

## 2020-09-18 DIAGNOSIS — I10 ESSENTIAL HYPERTENSION: Primary | ICD-10-CM

## 2020-09-18 DIAGNOSIS — I34.1 MITRAL VALVE PROLAPSE: ICD-10-CM

## 2020-09-18 DIAGNOSIS — I34.0 NONRHEUMATIC MITRAL VALVE REGURGITATION: ICD-10-CM

## 2020-09-18 DIAGNOSIS — I48.0 PAROXYSMAL ATRIAL FIBRILLATION: ICD-10-CM

## 2020-09-18 DIAGNOSIS — I50.21 ACUTE SYSTOLIC CONGESTIVE HEART FAILURE: ICD-10-CM

## 2020-09-18 PROCEDURE — 99999 PR PBB SHADOW E&M-EST. PATIENT-LVL III: ICD-10-PCS | Mod: PBBFAC,,, | Performed by: INTERNAL MEDICINE

## 2020-09-18 PROCEDURE — 99214 PR OFFICE/OUTPT VISIT, EST, LEVL IV, 30-39 MIN: ICD-10-PCS | Mod: S$GLB,,, | Performed by: INTERNAL MEDICINE

## 2020-09-18 PROCEDURE — 99999 PR PBB SHADOW E&M-EST. PATIENT-LVL III: CPT | Mod: PBBFAC,,, | Performed by: INTERNAL MEDICINE

## 2020-09-18 PROCEDURE — 99214 OFFICE O/P EST MOD 30 MIN: CPT | Mod: S$GLB,,, | Performed by: INTERNAL MEDICINE

## 2020-09-18 RX ORDER — ENALAPRIL MALEATE 10 MG/1
10 TABLET ORAL 2 TIMES DAILY
Qty: 60 TABLET | Refills: 11 | Status: SHIPPED | OUTPATIENT
Start: 2020-09-18 | End: 2021-09-18

## 2020-09-18 RX ORDER — METOPROLOL TARTRATE 50 MG/1
50 TABLET ORAL 2 TIMES DAILY
Qty: 60 TABLET | Refills: 11 | Status: SHIPPED | OUTPATIENT
Start: 2020-09-18 | End: 2021-09-18

## 2020-09-18 RX ORDER — HYDROCHLOROTHIAZIDE 25 MG/1
25 TABLET ORAL DAILY
Qty: 30 TABLET | Refills: 11 | Status: SHIPPED | OUTPATIENT
Start: 2020-09-18 | End: 2021-09-18

## 2020-09-18 NOTE — PROGRESS NOTES
Subjective:   Chief Complaint: cardiomyopathy, AFib RVR, paroxysmal AFib, mitral valve prolapse.: Atrial Fibrillation  Last Clinic Visit: 3/18/2020      History of Present  Illness: Ilya Thomas is a 49 y.o. gentleman with hypertension, paroxysmal atrial fibrillation, cardiomyopathy, mitral valve prolapse, who presents for follow-up.  Interval History:  Blood pressure was significantly elevated when we saw him last, we added hydrochlorothiazide, his mother sent in a log of blood pressures over the course of 2 weeks, and blood pressure much better controlled.  Blood pressure well controlled today 126/73.  He endorses one episode since we saw him where he came home from work, and felt some palpitations, felt his heart racing, and took his blood pressure, blood pressure was elevated 170/100.  He sat down and rested for some time, and blood pressure normalized.  Heart rate during that episode they think might have been 60, but he did feel heart racing.  Continuing to work at "[employer] and often does not wait for bus, walks all the way home. No other episodes besides this one.  Denies any chest pain, no orthopnea, no weight gain, no PND, no lower extremity edema, no syncope, no presyncope.  No bleeding on Eliquis.    3/18/2020  We obtained follow-up echocardiogram since we saw him last, and ejection fraction normalized.  Noted to have classic mitral valve prolapse, with moderate MR, but normal LV dimensions. We did stop his furosemide after we saw him last, given normalization of ejection fraction, and no heart failure symptoms.  Blood pressure elevated today at home on home cuff, but notably is a wrist cuff.     12/24/2019  He reports 3 days of cough, cold, congestion, did not take any OTC medication, but went into the ER ultimately and was found to be in AFib with RVR.  He became hypotensive with diltiazem, briefly admitted to the ICU, ultimately returned to sinus rhythm.  He had an echocardiogram performed, possibly  "at the time of RVR, with depressed ejection fraction, global, along with moderate MR from prolapse segment.  No prior history of cardiomyopathy, no prior history of AFib, no prior history of MR.  He is relatively flat affect, and comes in to clinic today with his mother.  He was prescribed Eliquis, metoprolol, enalapril, and Lasix at time of discharge, has been compliant with them.    Dx:  Atrial fibrillation with RVR  Systolic congestive heart failure, ejection fraction 35%, normalized 50 55%  Hypertension  Mitral regurgitation classic bileaflet prolapse.    Review of Systems   Constitution: Negative.   HENT: Negative.    Eyes: Negative.    Cardiovascular: Positive for palpitations.   Respiratory: Negative.    Hematologic/Lymphatic: Negative.    Skin: Negative.    Musculoskeletal: Negative.    Gastrointestinal: Negative.    Genitourinary: Negative.      Medications:  Current Outpatient Medications on File Prior to Visit   Medication Sig    [DISCONTINUED] apixaban (ELIQUIS) 5 mg Tab Take 1 tablet (5 mg total) by mouth 2 (two) times daily.    [DISCONTINUED] enalapril (VASOTEC) 10 MG tablet Take 1 tablet (10 mg total) by mouth 2 (two) times daily.    [DISCONTINUED] hydroCHLOROthiazide (HYDRODIURIL) 25 MG tablet Take 1 tablet (25 mg total) by mouth once daily.    [DISCONTINUED] metoprolol tartrate (LOPRESSOR) 50 MG tablet Take 1 tablet (50 mg total) by mouth 2 (two) times daily.     Current Facility-Administered Medications on File Prior to Visit   Medication    0.9%  NaCl infusion     Family History:  Denies any family history of cardiomyopathy    Social History:  Ilya reports that he has never smoked. He has never used smokeless tobacco. He reports that he does not drink alcohol or use drugs.  Works at [id number]  Objective:   /73   Pulse 66   Ht 5' 8" (1.727 m)   Wt 65.9 kg (145 lb 2.8 oz)   BMI 22.07 kg/m²   Patient reports blood pressure at home on wrist cuff 150/95  Physical Exam   Constitutional: " He is oriented to person, place, and time and well-developed, well-nourished, and in no distress. No distress.   HENT:   Head: Normocephalic and atraumatic.   Mouth/Throat: No oropharyngeal exudate.   Eyes: EOM are normal. No scleral icterus.   Neck: No JVD present. No tracheal deviation present. No thyromegaly present.   Cardiovascular: Normal rate and regular rhythm. Exam reveals no gallop and no friction rub.   Murmur (3/6 holosystolic murmur heard throughout precordium.) heard.  Pulmonary/Chest: Effort normal and breath sounds normal. No respiratory distress. He has no wheezes. He has no rales. He exhibits no tenderness.   Abdominal: Soft. He exhibits no distension. There is no abdominal tenderness. There is no rebound and no guarding.   Musculoskeletal: Normal range of motion.         General: No edema.   Neurological: He is alert and oriented to person, place, and time.   Skin: Skin is warm and dry. He is not diaphoretic. No erythema.   Psychiatric:   Flattened affect     EKG:  My independent visualization of most recent EKG is normal sinus rhythm    TTE:  12/12/2018  · Moderately decreased left ventricular systolic function. The estimated ejection fraction is 35%  · Moderate global hypokinetic wall motion.  · Mild eccentric left ventricular hypertrophy.  · Normal LV diastolic function.  · Severe left atrial enlargement.  · Mildly reduced right ventricular systolic function.  · Moderate right atrial enlargement.  · The mitral valve shows mild prolapse of the anterior mitral leaflet.  · Mild mitral prolapse of the posterior mitral leaflet.  · Mild-to-moderate mitral regurgitation.  · Mild tricuspid regurgitation.  · No pulmonary hypertension present.  · The estimated PA systolic pressure is 31 mm Hg  · Intermediate central venous pressure (8 mm Hg).   02/24/2020  · Normal left ventricular systolic function. The estimated ejection fraction is 55%.  · No wall motion abnormalities.  · Normal right ventricular  systolic function.  · Biatrial enlargement.  · Classic bileaflet MVP (a>p)  · Moderate, mid-late systolic, eccentric, posteriorly diected mitral regurgitation.  · Mild tricuspid regurgitation.  · The estimated PA systolic pressure is 44 mmHg.  · Pulmonary hypertension present.  · Normal central venous pressure (3 mmHg).    Lipids:       Renal:  Recent Labs   Lab 12/14/19  0338   Creatinine 1.4   Potassium 4.2   CO2 23   BUN, Bld 16     Liver:  Recent Labs   Lab 12/12/19  0416   AST 29   ALT 44       Assessment:     1. Essential hypertension    2. Acute systolic congestive heart failure    3. Mitral valve prolapse    4. Paroxysmal atrial fibrillation    5. Nonrheumatic mitral valve regurgitation      Plan:   1. Essential hypertension  Blood pressure better controlled today, continue enalapril, hydrochlorothiazide, metoprolol.  Notably we also have not checked lipids, need this for routine health maintenance screening, 49 years old.  - Lipid Panel; Future  - Comprehensive metabolic panel; Future  - hydroCHLOROthiazide (HYDRODIURIL) 25 MG tablet; Take 1 tablet (25 mg total) by mouth once daily.  Dispense: 30 tablet; Refill: 11    2. Acute systolic congestive heart failure  Ejection fraction normalized, suspect was related to AFib with RVR, one episode of potential AFib with RVR, if more frequent would consider monitoring at that time.  Given just this one episode, will discontinue to treat with current meds.  He does have notable murmur on exam, will get echo before we see him next.  - apixaban (ELIQUIS) 5 mg Tab; Take 1 tablet (5 mg total) by mouth 2 (two) times daily.  Dispense: 60 tablet; Refill: 11  - enalapril (VASOTEC) 10 MG tablet; Take 1 tablet (10 mg total) by mouth 2 (two) times daily.  Dispense: 60 tablet; Refill: 11  - metoprolol tartrate (LOPRESSOR) 50 MG tablet; Take 1 tablet (50 mg total) by mouth 2 (two) times daily.  Dispense: 60 tablet; Refill: 11  - Echo Color Flow Doppler? Yes; Future    3. Mitral  valve prolapse  Notable murmur on exam, history of bileaflet prolapse, will need to watch MR closely, repeat echo before I see him next.  - Echo Color Flow Doppler? Yes; Future    4. Paroxysmal atrial fibrillation  Continue Eliquis    5. Nonrheumatic mitral valve regurgitation        One year with echo before

## 2021-07-15 ENCOUNTER — PATIENT MESSAGE (OUTPATIENT)
Dept: INTERNAL MEDICINE | Facility: CLINIC | Age: 51
End: 2021-07-15